# Patient Record
Sex: MALE | Race: WHITE | NOT HISPANIC OR LATINO | Employment: UNEMPLOYED | URBAN - METROPOLITAN AREA
[De-identification: names, ages, dates, MRNs, and addresses within clinical notes are randomized per-mention and may not be internally consistent; named-entity substitution may affect disease eponyms.]

---

## 2017-01-06 ENCOUNTER — HISTORICAL (OUTPATIENT)
Dept: LAB | Facility: HOSPITAL | Age: 62
End: 2017-01-06

## 2017-02-06 ENCOUNTER — HISTORICAL (OUTPATIENT)
Dept: LAB | Facility: HOSPITAL | Age: 62
End: 2017-02-06

## 2017-03-06 ENCOUNTER — HISTORICAL (OUTPATIENT)
Dept: LAB | Facility: HOSPITAL | Age: 62
End: 2017-03-06

## 2017-04-05 ENCOUNTER — HISTORICAL (OUTPATIENT)
Dept: LAB | Facility: HOSPITAL | Age: 62
End: 2017-04-05

## 2017-04-13 ENCOUNTER — HISTORICAL (OUTPATIENT)
Dept: LAB | Facility: HOSPITAL | Age: 62
End: 2017-04-13

## 2017-05-05 ENCOUNTER — HISTORICAL (OUTPATIENT)
Dept: LAB | Facility: HOSPITAL | Age: 62
End: 2017-05-05

## 2017-05-05 LAB
INR PPP: 3.17
PROTHROMBIN TIME: 32.4 SECOND(S) (ref 9–11.5)

## 2017-06-05 ENCOUNTER — HISTORICAL (OUTPATIENT)
Dept: LAB | Facility: HOSPITAL | Age: 62
End: 2017-06-05

## 2017-06-05 LAB
INR PPP: 3.89
PROTHROMBIN TIME: 39.8 SECOND(S) (ref 9–11.5)

## 2017-06-08 ENCOUNTER — HISTORICAL (OUTPATIENT)
Dept: LAB | Facility: HOSPITAL | Age: 62
End: 2017-06-08

## 2017-06-08 LAB
INR PPP: 3.86
PROTHROMBIN TIME: 39.5 SECOND(S) (ref 9–11.5)

## 2017-06-15 ENCOUNTER — HISTORICAL (OUTPATIENT)
Dept: LAB | Facility: HOSPITAL | Age: 62
End: 2017-06-15

## 2017-06-15 LAB
INR PPP: 2.85
PROTHROMBIN TIME: 29.1 SECOND(S) (ref 9–11.5)

## 2017-07-03 ENCOUNTER — HISTORICAL (OUTPATIENT)
Dept: LAB | Facility: HOSPITAL | Age: 62
End: 2017-07-03

## 2017-07-03 LAB
INR PPP: 1.75
PROTHROMBIN TIME: 17.8 SECOND(S) (ref 9–11.5)

## 2017-07-10 ENCOUNTER — HISTORICAL (OUTPATIENT)
Dept: LAB | Facility: HOSPITAL | Age: 62
End: 2017-07-10

## 2017-07-10 LAB
INR PPP: 2.81
PROTHROMBIN TIME: 28.7 SECOND(S) (ref 9–11.5)

## 2017-08-08 ENCOUNTER — HISTORICAL (OUTPATIENT)
Dept: LAB | Facility: HOSPITAL | Age: 62
End: 2017-08-08

## 2017-08-08 LAB
INR PPP: 2.89
PROTHROMBIN TIME: 29.5 SECOND(S) (ref 9–11.5)

## 2017-09-06 ENCOUNTER — HISTORICAL (OUTPATIENT)
Dept: LAB | Facility: HOSPITAL | Age: 62
End: 2017-09-06

## 2017-09-06 LAB
ABS NEUT (OLG): 7.71
ALBUMIN SERPL-MCNC: 3.5 GM/DL (ref 3.4–5)
ALBUMIN/GLOB SERPL: 0.8 RATIO (ref 1.1–2)
ALP SERPL-CCNC: 112 UNIT/L (ref 50–136)
ALT SERPL-CCNC: 15 UNIT/L (ref 12–78)
AST SERPL-CCNC: 14 UNIT/L (ref 10–37)
BASOPHILS # BLD AUTO: 0.06 X10(3)/MCL
BASOPHILS NFR BLD AUTO: 0.5 %
BILIRUB SERPL-MCNC: 0.3 MG/DL (ref 0.2–1)
BILIRUBIN DIRECT+TOT PNL SERPL-MCNC: 0.14 MG/DL (ref 0.05–0.2)
BILIRUBIN DIRECT+TOT PNL SERPL-MCNC: 0.16 MG/DL
BUN SERPL-MCNC: 15 MG/DL (ref 7–18)
CALCIUM SERPL-MCNC: 9.4 MG/DL (ref 8.5–10.1)
CHLORIDE SERPL-SCNC: 100 MMOL/L (ref 98–107)
CHOLEST SERPL-MCNC: 213 MG/DL (ref 50–200)
CHOLEST/HDLC SERPL: 5 {RATIO} (ref 0–5)
CO2 SERPL-SCNC: 28.6 MMOL/L (ref 21–32)
CREAT SERPL-MCNC: 1.12 MG/DL (ref 0.7–1.3)
EOSINOPHIL # BLD AUTO: 0.36 X10(3)/MCL
EOSINOPHIL NFR BLD AUTO: 2.8 %
ERYTHROCYTE [DISTWIDTH] IN BLOOD BY AUTOMATED COUNT: 15 %
GLOBULIN SER-MCNC: 4.4 GM/DL (ref 2.4–3.5)
GLUCOSE SERPL-MCNC: 82 MG/DL (ref 74–106)
HCT VFR BLD AUTO: 48.1 % (ref 39–49)
HDLC SERPL-MCNC: 41 MG/DL (ref 35–60)
HGB BLD-MCNC: 16.8 GM/DL (ref 12.6–16.6)
IMM GRANULOCYTES # BLD AUTO: 0.03 10*3/UL (ref 0–0.1)
IMM GRANULOCYTES NFR BLD AUTO: 0.2 % (ref 0–1)
INR PPP: 2.73
LDLC SERPL CALC-MCNC: 136 MG/DL (ref 50–140)
LYMPHOCYTES # BLD AUTO: 3.56 X10(3)/MCL
LYMPHOCYTES NFR BLD AUTO: 27.8 %
MCH RBC QN AUTO: 30.6 PG (ref 27–33)
MCHC RBC AUTO-ENTMCNC: 34.9 GM/DL (ref 32–35)
MCV RBC AUTO: 87.6 FL (ref 84–97)
MONOCYTES # BLD AUTO: 1.1 X10(3)/MCL
MONOCYTES NFR BLD AUTO: 8.6 %
NEUTROPHILS # BLD AUTO: 7.71 X10(3)/MCL
NEUTROPHILS NFR BLD AUTO: 60.1 %
PLATELET # BLD AUTO: 269 X10(3)/MCL (ref 151–368)
PMV BLD AUTO: 10 FL
POTASSIUM SERPL-SCNC: 4.2 MMOL/L (ref 3.5–5.1)
PROT SERPL-MCNC: 7.9 GM/DL (ref 6.4–8.2)
PROTHROMBIN TIME: 28.2 SECOND(S) (ref 9–11.5)
PSA SERPL-MCNC: 0.55 NG/ML (ref 0–4)
RBC # BLD AUTO: 5.49 X10(6)/MCL (ref 4.3–5.6)
SODIUM SERPL-SCNC: 136 MMOL/L (ref 136–145)
TRIGL SERPL-MCNC: 179 MG/DL (ref 30–150)
VLDLC SERPL CALC-MCNC: 36 MG/DL
WBC # SPEC AUTO: 12.82 X10(3)/MCL (ref 3.4–9.2)

## 2017-10-04 ENCOUNTER — HISTORICAL (OUTPATIENT)
Dept: LAB | Facility: HOSPITAL | Age: 62
End: 2017-10-04

## 2017-10-04 LAB
INR PPP: 3.04
PROTHROMBIN TIME: 31.3 SECOND(S) (ref 9–11.5)

## 2017-11-01 ENCOUNTER — HISTORICAL (OUTPATIENT)
Dept: LAB | Facility: HOSPITAL | Age: 62
End: 2017-11-01

## 2017-11-01 LAB
INR PPP: 2.87
PROTHROMBIN TIME: 29.6 SECOND(S) (ref 9–11.5)

## 2017-12-08 ENCOUNTER — HISTORICAL (OUTPATIENT)
Dept: LAB | Facility: HOSPITAL | Age: 62
End: 2017-12-08

## 2017-12-08 LAB
INR PPP: 1.76
PROTHROMBIN TIME: 17.8 SECOND(S) (ref 9–11.5)

## 2017-12-14 ENCOUNTER — HISTORICAL (OUTPATIENT)
Dept: LAB | Facility: HOSPITAL | Age: 62
End: 2017-12-14

## 2017-12-14 LAB
INR PPP: 3.05
PROTHROMBIN TIME: 30.8 SECOND(S) (ref 9–11.5)

## 2018-01-08 ENCOUNTER — HISTORICAL (OUTPATIENT)
Dept: LAB | Facility: HOSPITAL | Age: 63
End: 2018-01-08

## 2018-01-08 LAB
INR PPP: 5.32
PROTHROMBIN TIME: 53.7 SECOND(S) (ref 9–11.5)

## 2018-01-11 ENCOUNTER — HISTORICAL (OUTPATIENT)
Dept: LAB | Facility: HOSPITAL | Age: 63
End: 2018-01-11

## 2018-01-11 LAB
INR PPP: 1.32
PROTHROMBIN TIME: 13.3 SECOND(S) (ref 9–11.5)

## 2018-01-18 ENCOUNTER — HISTORICAL (OUTPATIENT)
Dept: LAB | Facility: HOSPITAL | Age: 63
End: 2018-01-18

## 2018-01-18 LAB
INR PPP: 1.24
PROTHROMBIN TIME: 12.5 SECOND(S) (ref 9–11.5)

## 2018-01-26 ENCOUNTER — HISTORICAL (OUTPATIENT)
Dept: LAB | Facility: HOSPITAL | Age: 63
End: 2018-01-26

## 2018-01-26 LAB
INR PPP: 2.28
PROTHROMBIN TIME: 23 SECOND(S) (ref 9–11.5)

## 2018-02-08 ENCOUNTER — HISTORICAL (OUTPATIENT)
Dept: LAB | Facility: HOSPITAL | Age: 63
End: 2018-02-08

## 2018-02-08 LAB
INR PPP: 3.71
PROTHROMBIN TIME: 40.6 SECOND(S) (ref 9–11.5)

## 2018-03-07 ENCOUNTER — HISTORICAL (OUTPATIENT)
Dept: LAB | Facility: HOSPITAL | Age: 63
End: 2018-03-07

## 2018-03-07 LAB
INR PPP: 4.28
PROTHROMBIN TIME: 43.2 SECOND(S) (ref 9–11.5)

## 2018-03-09 ENCOUNTER — HISTORICAL (OUTPATIENT)
Dept: LAB | Facility: HOSPITAL | Age: 63
End: 2018-03-09

## 2018-03-09 LAB
INR PPP: 1.83
PROTHROMBIN TIME: 18.5 SECOND(S) (ref 9–11.5)

## 2018-03-19 ENCOUNTER — HISTORICAL (OUTPATIENT)
Dept: LAB | Facility: HOSPITAL | Age: 63
End: 2018-03-19

## 2018-03-19 LAB
INR PPP: 2.71
PROTHROMBIN TIME: 27.4 SECOND(S) (ref 9–11.5)

## 2018-04-09 ENCOUNTER — HISTORICAL (OUTPATIENT)
Dept: LAB | Facility: HOSPITAL | Age: 63
End: 2018-04-09

## 2018-04-09 LAB
INR PPP: 4.36
PROTHROMBIN TIME: 44 SECOND(S) (ref 9–11.5)

## 2018-04-13 ENCOUNTER — HISTORICAL (OUTPATIENT)
Dept: LAB | Facility: HOSPITAL | Age: 63
End: 2018-04-13

## 2018-04-13 LAB
INR PPP: 1.57
PROTHROMBIN TIME: 15.9 SECOND(S) (ref 9.3–11.4)

## 2018-04-27 ENCOUNTER — HISTORICAL (OUTPATIENT)
Dept: LAB | Facility: HOSPITAL | Age: 63
End: 2018-04-27

## 2018-04-27 LAB
INR PPP: 2.4
PROTHROMBIN TIME: 24 SECOND(S) (ref 9.3–11.4)

## 2018-05-08 ENCOUNTER — HISTORICAL (OUTPATIENT)
Dept: LAB | Facility: HOSPITAL | Age: 63
End: 2018-05-08

## 2018-05-08 LAB
INR PPP: 2.2
PROTHROMBIN TIME: 22.7 SECOND(S) (ref 9.3–11.4)

## 2018-06-08 ENCOUNTER — HISTORICAL (OUTPATIENT)
Dept: LAB | Facility: HOSPITAL | Age: 63
End: 2018-06-08

## 2018-06-08 LAB
INR PPP: 4
PROTHROMBIN TIME: 40.5 SECOND(S) (ref 9.3–11.4)

## 2018-06-15 ENCOUNTER — HISTORICAL (OUTPATIENT)
Dept: LAB | Facility: HOSPITAL | Age: 63
End: 2018-06-15

## 2018-06-15 LAB
INR PPP: 2.6
PROTHROMBIN TIME: 26 SECOND(S) (ref 9.3–11.4)

## 2018-07-10 ENCOUNTER — HISTORICAL (OUTPATIENT)
Dept: LAB | Facility: HOSPITAL | Age: 63
End: 2018-07-10

## 2018-07-10 LAB
INR PPP: 2.3
PROTHROMBIN TIME: 23.5 SECOND(S) (ref 9.3–11.4)

## 2018-08-09 ENCOUNTER — HISTORICAL (OUTPATIENT)
Dept: LAB | Facility: HOSPITAL | Age: 63
End: 2018-08-09

## 2018-08-09 LAB
INR PPP: 3.3
PROTHROMBIN TIME: 33.1 SECOND(S) (ref 9.3–11.4)

## 2018-09-10 ENCOUNTER — HISTORICAL (OUTPATIENT)
Dept: LAB | Facility: HOSPITAL | Age: 63
End: 2018-09-10

## 2018-09-10 LAB
BUN SERPL-MCNC: 11 MG/DL (ref 7–18)
CALCIUM SERPL-MCNC: 9 MG/DL (ref 8.5–10.1)
CHLORIDE SERPL-SCNC: 101 MMOL/L (ref 98–107)
CO2 SERPL-SCNC: 31.5 MMOL/L (ref 21–32)
CREAT SERPL-MCNC: 0.84 MG/DL (ref 0.7–1.3)
CREAT/UREA NIT SERPL: 13
GLUCOSE SERPL-MCNC: 79 MG/DL (ref 74–106)
INR PPP: 2.4
POTASSIUM SERPL-SCNC: 3.6 MMOL/L (ref 3.5–5.1)
PROTHROMBIN TIME: 23.8 SECOND(S) (ref 9.3–11.4)
SODIUM SERPL-SCNC: 137 MMOL/L (ref 136–145)

## 2018-10-08 ENCOUNTER — HISTORICAL (OUTPATIENT)
Dept: LAB | Facility: HOSPITAL | Age: 63
End: 2018-10-08

## 2018-10-08 LAB
INR PPP: 2.3
PROTHROMBIN TIME: 23.6 SECOND(S) (ref 9.3–11.4)

## 2018-11-08 ENCOUNTER — HISTORICAL (OUTPATIENT)
Dept: LAB | Facility: HOSPITAL | Age: 63
End: 2018-11-08

## 2018-11-08 LAB
INR PPP: 1.9
PROTHROMBIN TIME: 17.8 SECOND(S) (ref 8.6–10.1)

## 2018-11-15 ENCOUNTER — HISTORICAL (OUTPATIENT)
Dept: LAB | Facility: HOSPITAL | Age: 63
End: 2018-11-15

## 2018-11-15 LAB
INR PPP: 4.1
PROTHROMBIN TIME: 36.4 SECOND(S) (ref 8.6–10.1)

## 2018-11-20 ENCOUNTER — HISTORICAL (OUTPATIENT)
Dept: LAB | Facility: HOSPITAL | Age: 63
End: 2018-11-20

## 2018-11-20 LAB
INR PPP: 1.3
PROTHROMBIN TIME: 12.5 SECOND(S) (ref 8.6–10.1)

## 2018-12-06 ENCOUNTER — HISTORICAL (OUTPATIENT)
Dept: LAB | Facility: HOSPITAL | Age: 63
End: 2018-12-06

## 2018-12-06 LAB
INR PPP: 2.3
PROTHROMBIN TIME: 21 SECOND(S) (ref 8.6–10.1)

## 2018-12-20 ENCOUNTER — HISTORICAL (OUTPATIENT)
Dept: LAB | Facility: HOSPITAL | Age: 63
End: 2018-12-20

## 2018-12-20 LAB
INR PPP: 5
PROTHROMBIN TIME: 44 SECOND(S) (ref 8.6–10.1)
TSH SERPL-ACNC: 2.49 MIU/ML (ref 0.35–3.75)

## 2018-12-26 ENCOUNTER — HISTORICAL (OUTPATIENT)
Dept: LAB | Facility: HOSPITAL | Age: 63
End: 2018-12-26

## 2018-12-26 LAB
INR PPP: 1.4
PROTHROMBIN TIME: 12.8 SECOND(S) (ref 8.6–10.1)

## 2019-01-07 ENCOUNTER — HISTORICAL (OUTPATIENT)
Dept: LAB | Facility: HOSPITAL | Age: 64
End: 2019-01-07

## 2019-01-07 LAB
ABS NEUT (OLG): 5.14
ALBUMIN SERPL-MCNC: 3.5 GM/DL (ref 3.4–5)
ALBUMIN/GLOB SERPL: 0.8 RATIO (ref 1.1–2)
ALP SERPL-CCNC: 121 UNIT/L (ref 46–116)
ALT SERPL-CCNC: 18 UNIT/L (ref 12–78)
AST SERPL-CCNC: 14 UNIT/L (ref 10–37)
BASOPHILS # BLD AUTO: 0.08 X10(3)/MCL
BASOPHILS NFR BLD AUTO: 0.8 %
BILIRUB SERPL-MCNC: 0.4 MG/DL (ref 0.2–1)
BILIRUBIN DIRECT+TOT PNL SERPL-MCNC: 0.12 MG/DL (ref 0–0.2)
BILIRUBIN DIRECT+TOT PNL SERPL-MCNC: 0.28 MG/DL
BUN SERPL-MCNC: 15 MG/DL (ref 7–18)
CALCIUM SERPL-MCNC: 8.9 MG/DL (ref 8.5–10.1)
CHLORIDE SERPL-SCNC: 99 MMOL/L (ref 98–107)
CHOLEST SERPL-MCNC: 223 MG/DL (ref 50–200)
CHOLEST/HDLC SERPL: 6 {RATIO} (ref 0–5)
CO2 SERPL-SCNC: 30.5 MMOL/L (ref 21–32)
CREAT SERPL-MCNC: 1.02 MG/DL (ref 0.7–1.3)
EOSINOPHIL # BLD AUTO: 0.26 X10(3)/MCL
EOSINOPHIL NFR BLD AUTO: 2.6 %
ERYTHROCYTE [DISTWIDTH] IN BLOOD BY AUTOMATED COUNT: 15 %
GLOBULIN SER-MCNC: 4.4 GM/DL (ref 2.4–3.5)
GLUCOSE SERPL-MCNC: 80 MG/DL (ref 74–106)
HCT VFR BLD AUTO: 49.8 % (ref 39–49)
HDLC SERPL-MCNC: 38 MG/DL (ref 35–60)
HGB BLD-MCNC: 17.2 GM/DL (ref 12.6–16.6)
IMM GRANULOCYTES # BLD AUTO: 0.02 10*3/UL (ref 0–0.1)
IMM GRANULOCYTES NFR BLD AUTO: 0.2 % (ref 0–1)
INR PPP: 3.1
LDLC SERPL CALC-MCNC: 159 MG/DL (ref 50–140)
LYMPHOCYTES # BLD AUTO: 3.59 X10(3)/MCL
LYMPHOCYTES NFR BLD AUTO: 36.5 %
MCH RBC QN AUTO: 30.7 PG (ref 27–33)
MCHC RBC AUTO-ENTMCNC: 34.5 GM/DL (ref 32–35)
MCV RBC AUTO: 88.8 FL (ref 84–97)
MONOCYTES # BLD AUTO: 0.75 X10(3)/MCL
MONOCYTES NFR BLD AUTO: 7.6 %
NEUTROPHILS # BLD AUTO: 5.14 X10(3)/MCL
NEUTROPHILS NFR BLD AUTO: 52.3 %
PLATELET # BLD AUTO: 261 X10(3)/MCL (ref 151–368)
PMV BLD AUTO: 10 FL
POTASSIUM SERPL-SCNC: 4 MMOL/L (ref 3.5–5.1)
PROT SERPL-MCNC: 7.9 GM/DL (ref 6.4–8.2)
PROTHROMBIN TIME: 27.9 SECOND(S) (ref 8.6–10.1)
PSA SERPL-MCNC: 0.6 NG/ML (ref 0–4)
RBC # BLD AUTO: 5.61 X10(6)/MCL (ref 4.3–5.6)
SODIUM SERPL-SCNC: 137 MMOL/L (ref 136–145)
TRIGL SERPL-MCNC: 128 MG/DL (ref 30–150)
VLDLC SERPL CALC-MCNC: 26 MG/DL
WBC # SPEC AUTO: 9.84 X10(3)/MCL (ref 3.4–9.2)

## 2019-02-14 ENCOUNTER — HISTORICAL (OUTPATIENT)
Dept: LAB | Facility: HOSPITAL | Age: 64
End: 2019-02-14

## 2019-02-14 LAB
INR PPP: 1.8
PROTHROMBIN TIME: 16.6 SECOND(S) (ref 8.6–10.1)

## 2019-02-21 ENCOUNTER — HISTORICAL (OUTPATIENT)
Dept: LAB | Facility: HOSPITAL | Age: 64
End: 2019-02-21

## 2019-02-21 LAB
INR PPP: 1.4
PROTHROMBIN TIME: 12.9 SECOND(S) (ref 8.6–10.1)

## 2019-02-28 ENCOUNTER — HISTORICAL (OUTPATIENT)
Dept: LAB | Facility: HOSPITAL | Age: 64
End: 2019-02-28

## 2019-02-28 LAB
INR PPP: 3.5
PROTHROMBIN TIME: 31.3 SECOND(S) (ref 8.6–10.1)

## 2019-03-12 ENCOUNTER — HISTORICAL (OUTPATIENT)
Dept: LAB | Facility: HOSPITAL | Age: 64
End: 2019-03-12

## 2019-03-12 LAB
INR PPP: 3.4
PROTHROMBIN TIME: 30 SECOND(S) (ref 8.6–10.1)

## 2019-04-10 ENCOUNTER — HISTORICAL (OUTPATIENT)
Dept: LAB | Facility: HOSPITAL | Age: 64
End: 2019-04-10

## 2019-04-10 LAB
INR PPP: 2.4
PROTHROMBIN TIME: 22.2 SECOND(S) (ref 8.6–10.1)

## 2019-05-09 ENCOUNTER — HISTORICAL (OUTPATIENT)
Dept: LAB | Facility: HOSPITAL | Age: 64
End: 2019-05-09

## 2019-05-09 LAB
INR PPP: 4.9
PROTHROMBIN TIME: 42.6 SECOND(S) (ref 8.6–10.1)

## 2019-05-13 ENCOUNTER — HISTORICAL (OUTPATIENT)
Dept: LAB | Facility: HOSPITAL | Age: 64
End: 2019-05-13

## 2019-05-13 LAB
INR PPP: 1
PROTHROMBIN TIME: 10 SECOND(S) (ref 8.6–10.1)

## 2019-05-16 ENCOUNTER — HISTORICAL (OUTPATIENT)
Dept: LAB | Facility: HOSPITAL | Age: 64
End: 2019-05-16

## 2019-05-16 LAB
INR PPP: 1.4
PROTHROMBIN TIME: 13.3 SECOND(S) (ref 8.6–10.1)

## 2019-05-22 ENCOUNTER — HISTORICAL (OUTPATIENT)
Dept: LAB | Facility: HOSPITAL | Age: 64
End: 2019-05-22

## 2019-05-22 LAB
INR PPP: 2.8
PROTHROMBIN TIME: 25 SECOND(S) (ref 8.6–10.1)

## 2019-06-10 ENCOUNTER — HISTORICAL (OUTPATIENT)
Dept: LAB | Facility: HOSPITAL | Age: 64
End: 2019-06-10

## 2019-06-10 LAB
INR PPP: 4.7
PROTHROMBIN TIME: 41.5 SECOND(S) (ref 8.6–10.1)

## 2019-06-13 ENCOUNTER — HISTORICAL (OUTPATIENT)
Dept: LAB | Facility: HOSPITAL | Age: 64
End: 2019-06-13

## 2019-06-13 LAB
INR PPP: 1.5
PROTHROMBIN TIME: 14.2 SECOND(S) (ref 8.6–10.1)

## 2019-06-27 ENCOUNTER — HISTORICAL (OUTPATIENT)
Dept: LAB | Facility: HOSPITAL | Age: 64
End: 2019-06-27

## 2019-06-27 LAB
INR PPP: 2.8
PROTHROMBIN TIME: 25.2 SECOND(S) (ref 8.6–10.1)

## 2019-07-11 ENCOUNTER — HISTORICAL (OUTPATIENT)
Dept: LAB | Facility: HOSPITAL | Age: 64
End: 2019-07-11

## 2019-07-11 LAB
INR PPP: 3.2
PROTHROMBIN TIME: 28.4 SECOND(S) (ref 8.6–10.1)

## 2019-08-08 ENCOUNTER — HISTORICAL (OUTPATIENT)
Dept: LAB | Facility: HOSPITAL | Age: 64
End: 2019-08-08

## 2019-08-08 LAB
INR PPP: 4.1
PROTHROMBIN TIME: 36.2 SECOND(S) (ref 8.6–10.1)

## 2019-08-12 ENCOUNTER — HISTORICAL (OUTPATIENT)
Dept: LAB | Facility: HOSPITAL | Age: 64
End: 2019-08-12

## 2019-08-12 LAB
INR PPP: 1.1
PROTHROMBIN TIME: 10.4 SECOND(S) (ref 8.6–10.1)

## 2019-08-19 ENCOUNTER — HISTORICAL (OUTPATIENT)
Dept: LAB | Facility: HOSPITAL | Age: 64
End: 2019-08-19

## 2019-08-19 LAB
INR PPP: 2.4
PROTHROMBIN TIME: 21.9 SECOND(S) (ref 8.6–10.1)

## 2019-09-09 ENCOUNTER — HISTORICAL (OUTPATIENT)
Dept: LAB | Facility: HOSPITAL | Age: 64
End: 2019-09-09

## 2019-09-09 LAB
INR PPP: 3.8
PROTHROMBIN TIME: 33.8 SECOND(S) (ref 8.6–10.1)

## 2019-09-11 ENCOUNTER — HISTORICAL (OUTPATIENT)
Dept: LAB | Facility: HOSPITAL | Age: 64
End: 2019-09-11

## 2019-09-11 LAB
INR PPP: 2.6
PROTHROMBIN TIME: 23.7 SECOND(S) (ref 8.6–10.1)

## 2019-09-24 ENCOUNTER — HISTORICAL (OUTPATIENT)
Dept: LAB | Facility: HOSPITAL | Age: 64
End: 2019-09-24

## 2019-09-24 LAB
INR PPP: 1.3
PROTHROMBIN TIME: 12.5 SECOND(S) (ref 8.6–10.1)

## 2019-09-30 ENCOUNTER — HISTORICAL (OUTPATIENT)
Dept: LAB | Facility: HOSPITAL | Age: 64
End: 2019-09-30

## 2019-09-30 LAB
INR PPP: 1.5
PROTHROMBIN TIME: 14.3 SECOND(S) (ref 8.6–10.1)

## 2019-10-07 ENCOUNTER — HISTORICAL (OUTPATIENT)
Dept: LAB | Facility: HOSPITAL | Age: 64
End: 2019-10-07

## 2019-10-07 LAB
INR PPP: 2.3
PROTHROMBIN TIME: 20.9 SECOND(S) (ref 8.6–10.1)

## 2019-10-21 ENCOUNTER — HISTORICAL (OUTPATIENT)
Dept: LAB | Facility: HOSPITAL | Age: 64
End: 2019-10-21

## 2019-10-21 LAB
INR PPP: 3.4
PROTHROMBIN TIME: 30.6 SECOND(S) (ref 8.6–10.1)

## 2019-11-11 ENCOUNTER — HISTORICAL (OUTPATIENT)
Dept: LAB | Facility: HOSPITAL | Age: 64
End: 2019-11-11

## 2019-11-11 LAB
INR PPP: 3.7
PROTHROMBIN TIME: 32.7 SECOND(S) (ref 8.6–10.1)

## 2019-11-18 ENCOUNTER — HISTORICAL (OUTPATIENT)
Dept: LAB | Facility: HOSPITAL | Age: 64
End: 2019-11-18

## 2019-11-18 LAB
INR PPP: 1.1
PROTHROMBIN TIME: 10.9 SECOND(S) (ref 8.6–10.1)

## 2019-11-25 ENCOUNTER — HISTORICAL (OUTPATIENT)
Dept: LAB | Facility: HOSPITAL | Age: 64
End: 2019-11-25

## 2019-11-25 LAB
INR PPP: 2.6
PROTHROMBIN TIME: 26 SECOND(S) (ref 8.6–10.1)

## 2019-12-09 ENCOUNTER — HISTORICAL (OUTPATIENT)
Dept: LAB | Facility: HOSPITAL | Age: 64
End: 2019-12-09

## 2019-12-09 LAB
INR PPP: 4.7
PROTHROMBIN TIME: 47.4 SECOND(S) (ref 8.6–10.1)

## 2019-12-11 ENCOUNTER — HISTORICAL (OUTPATIENT)
Dept: LAB | Facility: HOSPITAL | Age: 64
End: 2019-12-11

## 2019-12-11 LAB
INR PPP: 1.8
PROTHROMBIN TIME: 18.3 SECOND(S) (ref 8.6–10.1)

## 2019-12-18 ENCOUNTER — HISTORICAL (OUTPATIENT)
Dept: LAB | Facility: HOSPITAL | Age: 64
End: 2019-12-18

## 2019-12-18 LAB
INR PPP: 2.3
PROTHROMBIN TIME: 23.7 SECOND(S) (ref 8.6–10.1)

## 2019-12-26 ENCOUNTER — HISTORICAL (OUTPATIENT)
Dept: LAB | Facility: HOSPITAL | Age: 64
End: 2019-12-26

## 2019-12-26 LAB
INR PPP: 1.7
PROTHROMBIN TIME: 17.4 SECOND(S) (ref 8.6–10.1)

## 2020-01-02 ENCOUNTER — HISTORICAL (OUTPATIENT)
Dept: LAB | Facility: HOSPITAL | Age: 65
End: 2020-01-02

## 2020-01-02 LAB
INR PPP: 1.6
PROTHROMBIN TIME: 16.4 SECOND(S) (ref 8.6–10.1)

## 2020-01-27 ENCOUNTER — HISTORICAL (OUTPATIENT)
Dept: LAB | Facility: HOSPITAL | Age: 65
End: 2020-01-27

## 2020-01-27 LAB
BUN SERPL-MCNC: 19 MG/DL (ref 8.4–25.7)
CALCIUM SERPL-MCNC: 9.8 MG/DL (ref 8.8–10)
CHLORIDE SERPL-SCNC: 98 MMOL/L (ref 98–107)
CO2 SERPL-SCNC: 32 MEQ/L (ref 23–31)
CREAT SERPL-MCNC: 1 MG/DL (ref 0.73–1.18)
CREAT/UREA NIT SERPL: 19
GLUCOSE SERPL-MCNC: 74 MG/DL (ref 82–115)
INR PPP: 1.6
POTASSIUM SERPL-SCNC: 4 MMOL/L (ref 3.5–5.1)
PROTHROMBIN TIME: 15.9 SECOND(S) (ref 8.6–10.1)
SODIUM SERPL-SCNC: 138 MMOL/L (ref 136–145)

## 2020-02-03 ENCOUNTER — HISTORICAL (OUTPATIENT)
Dept: LAB | Facility: HOSPITAL | Age: 65
End: 2020-02-03

## 2020-02-03 LAB
INR PPP: 3
PROTHROMBIN TIME: 30.6 SECOND(S) (ref 8.6–10.1)

## 2020-03-09 ENCOUNTER — HISTORICAL (OUTPATIENT)
Dept: LAB | Facility: HOSPITAL | Age: 65
End: 2020-03-09

## 2020-03-09 LAB
INR PPP: 3.4
PROTHROMBIN TIME: 34 SECOND(S) (ref 8.6–10.1)

## 2020-04-17 ENCOUNTER — HISTORICAL (OUTPATIENT)
Dept: LAB | Facility: HOSPITAL | Age: 65
End: 2020-04-17

## 2020-04-17 LAB
INR PPP: 3
PROTHROMBIN TIME: 29.9 SECOND(S) (ref 8.6–10.1)

## 2020-05-08 ENCOUNTER — HISTORICAL (OUTPATIENT)
Dept: LAB | Facility: HOSPITAL | Age: 65
End: 2020-05-08

## 2020-05-08 LAB
INR PPP: 3.6
PROTHROMBIN TIME: 36 SECOND(S) (ref 8.6–10.1)

## 2020-05-15 ENCOUNTER — HISTORICAL (OUTPATIENT)
Dept: LAB | Facility: HOSPITAL | Age: 65
End: 2020-05-15

## 2020-05-15 LAB
INR PPP: 2.9
PROTHROMBIN TIME: 29.2 SECOND(S) (ref 8.6–10.1)

## 2020-06-08 ENCOUNTER — HISTORICAL (OUTPATIENT)
Dept: LAB | Facility: HOSPITAL | Age: 65
End: 2020-06-08

## 2020-06-08 LAB
INR PPP: 6.2
PROTHROMBIN TIME: 63.1 SECOND(S) (ref 8.6–10.1)

## 2020-06-10 ENCOUNTER — HISTORICAL (OUTPATIENT)
Dept: LAB | Facility: HOSPITAL | Age: 65
End: 2020-06-10

## 2020-06-10 LAB
ALBUMIN SERPL-MCNC: 3.1 GM/DL (ref 3.4–4.8)
ALBUMIN/GLOB SERPL: 0.8 RATIO (ref 1.1–2)
ALP SERPL-CCNC: 138 UNIT/L (ref 40–150)
ALT SERPL-CCNC: 10 UNIT/L (ref 0–55)
AST SERPL-CCNC: 18 UNIT/L (ref 5–34)
BILIRUB SERPL-MCNC: 0.8 MG/DL
BILIRUBIN DIRECT+TOT PNL SERPL-MCNC: 0.4 MG/DL
BILIRUBIN DIRECT+TOT PNL SERPL-MCNC: 0.4 MG/DL (ref 0–0.5)
BUN SERPL-MCNC: 26 MG/DL (ref 8.4–25.7)
CALCIUM SERPL-MCNC: 8.7 MG/DL (ref 8.8–10)
CHLORIDE SERPL-SCNC: 104 MMOL/L (ref 98–107)
CHOLEST SERPL-MCNC: 90 MG/DL
CHOLEST/HDLC SERPL: 3 {RATIO} (ref 0–5)
CO2 SERPL-SCNC: 26 MEQ/L (ref 23–31)
CREAT SERPL-MCNC: 1.13 MG/DL (ref 0.73–1.18)
GLOBULIN SER-MCNC: 3.8 GM/DL (ref 2.4–3.5)
GLUCOSE SERPL-MCNC: 88 MG/DL (ref 82–115)
HDLC SERPL-MCNC: 30 MG/DL (ref 35–60)
INR PPP: 2.4
LDLC SERPL CALC-MCNC: 45 MG/DL (ref 50–140)
POTASSIUM SERPL-SCNC: 4.3 MMOL/L (ref 3.5–5.1)
PROT SERPL-MCNC: 6.9 GM/DL (ref 5.8–7.6)
PROTHROMBIN TIME: 24.2 SECOND(S) (ref 8.6–10.1)
PSA SERPL-MCNC: 0.41 NG/ML
SODIUM SERPL-SCNC: 138 MMOL/L (ref 136–145)
TRIGL SERPL-MCNC: 77 MG/DL (ref 34–140)
VLDLC SERPL CALC-MCNC: 15 MG/DL

## 2020-06-23 ENCOUNTER — HISTORICAL (OUTPATIENT)
Dept: LAB | Facility: HOSPITAL | Age: 65
End: 2020-06-23

## 2020-06-23 LAB
INR PPP: 3
PROTHROMBIN TIME: 30.2 SECOND(S) (ref 8.6–10.1)

## 2020-07-09 ENCOUNTER — HISTORICAL (OUTPATIENT)
Dept: LAB | Facility: HOSPITAL | Age: 65
End: 2020-07-09

## 2020-07-09 LAB
INR PPP: 4.3
PROTHROMBIN TIME: 43.2 SECOND(S) (ref 8.6–10.1)

## 2020-07-13 ENCOUNTER — HISTORICAL (OUTPATIENT)
Dept: LAB | Facility: HOSPITAL | Age: 65
End: 2020-07-13

## 2020-07-13 LAB
INR PPP: 1.8
PROTHROMBIN TIME: 18 SECOND(S) (ref 8.6–10.1)

## 2020-07-20 ENCOUNTER — HISTORICAL (OUTPATIENT)
Dept: LAB | Facility: HOSPITAL | Age: 65
End: 2020-07-20

## 2020-07-20 LAB
INR PPP: 2.2
PROTHROMBIN TIME: 22.7 SECOND(S) (ref 8.6–10.1)

## 2020-07-27 ENCOUNTER — HISTORICAL (OUTPATIENT)
Dept: LAB | Facility: HOSPITAL | Age: 65
End: 2020-07-27

## 2020-07-27 LAB
INR PPP: 3.5
PROTHROMBIN TIME: 35.9 SECOND(S) (ref 8.6–10.1)

## 2020-08-03 ENCOUNTER — HISTORICAL (OUTPATIENT)
Dept: LAB | Facility: HOSPITAL | Age: 65
End: 2020-08-03

## 2020-08-03 LAB
INR PPP: 5.9
PROTHROMBIN TIME: 59.2 SECOND(S) (ref 8.6–10.1)

## 2020-08-05 ENCOUNTER — HISTORICAL (OUTPATIENT)
Dept: LAB | Facility: HOSPITAL | Age: 65
End: 2020-08-05

## 2020-08-05 LAB
INR PPP: 2.8
PROTHROMBIN TIME: 28.7 SECOND(S) (ref 8.6–10.1)

## 2020-09-08 ENCOUNTER — HISTORICAL (OUTPATIENT)
Dept: LAB | Facility: HOSPITAL | Age: 65
End: 2020-09-08

## 2020-09-08 LAB
INR PPP: 5.1
PROTHROMBIN TIME: 51.3 SECOND(S) (ref 8.6–10.1)

## 2020-09-10 ENCOUNTER — HISTORICAL (OUTPATIENT)
Dept: LAB | Facility: HOSPITAL | Age: 65
End: 2020-09-10

## 2020-09-10 LAB
INR PPP: 2.8
PROTHROMBIN TIME: 28.8 SECOND(S) (ref 8.6–10.1)

## 2020-09-17 ENCOUNTER — HISTORICAL (OUTPATIENT)
Dept: LAB | Facility: HOSPITAL | Age: 65
End: 2020-09-17

## 2020-09-17 LAB
INR PPP: 1.6
PROTHROMBIN TIME: 16.3 SECOND(S) (ref 8.6–10.1)

## 2020-09-24 ENCOUNTER — HISTORICAL (OUTPATIENT)
Dept: LAB | Facility: HOSPITAL | Age: 65
End: 2020-09-24

## 2020-09-24 LAB
INR PPP: 3.9
PROTHROMBIN TIME: 39.9 SECOND(S) (ref 8.6–10.1)

## 2020-10-01 ENCOUNTER — HISTORICAL (OUTPATIENT)
Dept: LAB | Facility: HOSPITAL | Age: 65
End: 2020-10-01

## 2020-10-01 LAB
ABS NEUT (OLG): 7.95
BASOPHILS # BLD AUTO: 0.07 X10(3)/MCL
BASOPHILS NFR BLD AUTO: 0.6 %
BUN SERPL-MCNC: 19 MG/DL (ref 8.4–25.7)
CALCIUM SERPL-MCNC: 9 MG/DL (ref 8.8–10)
CHLORIDE SERPL-SCNC: 101 MMOL/L (ref 98–107)
CO2 SERPL-SCNC: 29 MEQ/L (ref 23–31)
CREAT SERPL-MCNC: 0.96 MG/DL (ref 0.73–1.18)
CREAT/UREA NIT SERPL: 20
EOSINOPHIL # BLD AUTO: 0.22 X10(3)/MCL
EOSINOPHIL NFR BLD AUTO: 1.8 %
ERYTHROCYTE [DISTWIDTH] IN BLOOD BY AUTOMATED COUNT: 16 %
GLUCOSE SERPL-MCNC: 88 MG/DL (ref 82–115)
HCT VFR BLD AUTO: 48.5 % (ref 39–49)
HGB BLD-MCNC: 16.6 GM/DL (ref 12.6–16.6)
IMM GRANULOCYTES # BLD AUTO: 0.03 10*3/UL (ref 0–0.1)
IMM GRANULOCYTES NFR BLD AUTO: 0.3 % (ref 0–1)
INR PPP: 3.5
LYMPHOCYTES # BLD AUTO: 2.76 X10(3)/MCL
LYMPHOCYTES NFR BLD AUTO: 23.1 %
MCH RBC QN AUTO: 30.3 PG (ref 27–33)
MCHC RBC AUTO-ENTMCNC: 34.2 GM/DL (ref 32–35)
MCV RBC AUTO: 88.7 FL (ref 84–97)
MONOCYTES # BLD AUTO: 0.9 X10(3)/MCL
MONOCYTES NFR BLD AUTO: 7.5 %
NEUTROPHILS # BLD AUTO: 7.95 X10(3)/MCL
NEUTROPHILS NFR BLD AUTO: 66.7 %
PLATELET # BLD AUTO: 280 X10(3)/MCL (ref 140–450)
PMV BLD AUTO: 9 FL
POTASSIUM SERPL-SCNC: 4.5 MMOL/L (ref 3.5–5.1)
PROTHROMBIN TIME: 35.8 SECOND(S) (ref 8.6–10.1)
RBC # BLD AUTO: 5.47 X10(6)/MCL (ref 4.3–5.6)
SODIUM SERPL-SCNC: 140 MMOL/L (ref 136–145)
WBC # SPEC AUTO: 11.93 X10(3)/MCL (ref 3.4–9.2)

## 2020-10-08 ENCOUNTER — HISTORICAL (OUTPATIENT)
Dept: LAB | Facility: HOSPITAL | Age: 65
End: 2020-10-08

## 2020-10-08 LAB
INR PPP: 4.1
PROTHROMBIN TIME: 41.8 SECOND(S) (ref 8.6–10.1)

## 2020-10-15 ENCOUNTER — HISTORICAL (OUTPATIENT)
Dept: LAB | Facility: HOSPITAL | Age: 65
End: 2020-10-15

## 2020-10-15 LAB
INR PPP: 3.1
PROTHROMBIN TIME: 31 SECOND(S) (ref 8.6–10.1)

## 2020-10-21 ENCOUNTER — HISTORICAL (OUTPATIENT)
Dept: RADIOLOGY | Facility: HOSPITAL | Age: 65
End: 2020-10-21

## 2020-11-02 ENCOUNTER — HISTORICAL (OUTPATIENT)
Dept: RADIOLOGY | Facility: HOSPITAL | Age: 65
End: 2020-11-02

## 2020-11-09 ENCOUNTER — HISTORICAL (OUTPATIENT)
Dept: LAB | Facility: HOSPITAL | Age: 65
End: 2020-11-09

## 2020-11-09 LAB
INR PPP: 3.4
PROTHROMBIN TIME: 34.2 SECOND(S) (ref 8.6–10.1)

## 2020-11-23 ENCOUNTER — HISTORICAL (OUTPATIENT)
Dept: LAB | Facility: HOSPITAL | Age: 65
End: 2020-11-23

## 2020-11-23 LAB
INR PPP: 3.8
PROTHROMBIN TIME: 35.3 SECOND(S) (ref 9.1–10.9)

## 2020-11-30 ENCOUNTER — HISTORICAL (OUTPATIENT)
Dept: LAB | Facility: HOSPITAL | Age: 65
End: 2020-11-30

## 2020-11-30 LAB
INR PPP: 3.3
PROTHROMBIN TIME: 31 SECOND(S) (ref 9.1–10.9)

## 2020-12-14 ENCOUNTER — HISTORICAL (OUTPATIENT)
Dept: LAB | Facility: HOSPITAL | Age: 65
End: 2020-12-14

## 2020-12-14 LAB
INR PPP: 2.6
PROTHROMBIN TIME: 24.6 SECOND(S) (ref 9.1–10.9)

## 2021-01-08 ENCOUNTER — HISTORICAL (OUTPATIENT)
Dept: LAB | Facility: HOSPITAL | Age: 66
End: 2021-01-08

## 2021-01-08 LAB
INR PPP: 2.8
PROTHROMBIN TIME: 26.4 SECOND(S) (ref 9.1–10.9)

## 2021-02-08 ENCOUNTER — HISTORICAL (OUTPATIENT)
Dept: LAB | Facility: HOSPITAL | Age: 66
End: 2021-02-08

## 2021-02-08 LAB
INR PPP: 2.9
PROTHROMBIN TIME: 28 SECOND(S) (ref 9.1–10.9)

## 2021-03-09 ENCOUNTER — HISTORICAL (OUTPATIENT)
Dept: LAB | Facility: HOSPITAL | Age: 66
End: 2021-03-09

## 2021-03-09 LAB
INR PPP: 3.5
PROTHROMBIN TIME: 33.1 SECOND(S) (ref 9.1–10.9)

## 2021-04-08 ENCOUNTER — HISTORICAL (OUTPATIENT)
Dept: LAB | Facility: HOSPITAL | Age: 66
End: 2021-04-08

## 2021-04-08 LAB
INR PPP: 3.3
PROTHROMBIN TIME: 31.6 SECOND(S) (ref 9.1–10.9)

## 2021-05-10 ENCOUNTER — HISTORICAL (OUTPATIENT)
Dept: LAB | Facility: HOSPITAL | Age: 66
End: 2021-05-10

## 2021-05-10 LAB
INR PPP: 3.7
PROTHROMBIN TIME: 35.1 SECOND(S) (ref 9.1–10.9)

## 2021-05-14 ENCOUNTER — HISTORICAL (OUTPATIENT)
Dept: LAB | Facility: HOSPITAL | Age: 66
End: 2021-05-14

## 2021-05-14 LAB
ALBUMIN SERPL-MCNC: 3.4 GM/DL (ref 3.4–4.8)
ALBUMIN/GLOB SERPL: 0.7 RATIO (ref 1.1–2)
ALP SERPL-CCNC: 138 UNIT/L (ref 40–150)
ALT SERPL-CCNC: 13 UNIT/L (ref 0–55)
AST SERPL-CCNC: 15 UNIT/L (ref 5–34)
BILIRUB SERPL-MCNC: 0.7 MG/DL
BILIRUBIN DIRECT+TOT PNL SERPL-MCNC: 0.3 MG/DL
BILIRUBIN DIRECT+TOT PNL SERPL-MCNC: 0.4 MG/DL (ref 0–0.5)
BUN SERPL-MCNC: 17 MG/DL (ref 8.4–25.7)
CALCIUM SERPL-MCNC: 9.4 MG/DL (ref 8.8–10)
CHLORIDE SERPL-SCNC: 100 MMOL/L (ref 98–107)
CO2 SERPL-SCNC: 31 MEQ/L (ref 23–31)
CREAT SERPL-MCNC: 1.03 MG/DL (ref 0.73–1.18)
GLOBULIN SER-MCNC: 4.6 GM/DL (ref 2.4–3.5)
GLUCOSE SERPL-MCNC: 96 MG/DL (ref 82–115)
POTASSIUM SERPL-SCNC: 4.3 MMOL/L (ref 3.5–5.1)
PROT SERPL-MCNC: 8 GM/DL (ref 5.8–7.6)
SODIUM SERPL-SCNC: 140 MMOL/L (ref 136–145)

## 2021-06-08 ENCOUNTER — HISTORICAL (OUTPATIENT)
Dept: LAB | Facility: HOSPITAL | Age: 66
End: 2021-06-08

## 2021-06-08 LAB
INR PPP: 3.4
PROTHROMBIN TIME: 32.3 SECOND(S) (ref 9.1–10.9)

## 2021-06-28 ENCOUNTER — HISTORICAL (OUTPATIENT)
Dept: LAB | Facility: HOSPITAL | Age: 66
End: 2021-06-28

## 2021-06-28 LAB
ABS NEUT (OLG): 6.45
ALBUMIN SERPL-MCNC: 3.5 GM/DL (ref 3.4–4.8)
ALBUMIN/GLOB SERPL: 0.8 RATIO (ref 1.1–2)
ALP SERPL-CCNC: 135 UNIT/L (ref 40–150)
ALT SERPL-CCNC: 17 UNIT/L (ref 0–55)
AST SERPL-CCNC: 20 UNIT/L (ref 5–34)
BASOPHILS # BLD AUTO: 0.06 X10(3)/MCL
BASOPHILS NFR BLD AUTO: 0.5 %
BILIRUB SERPL-MCNC: 0.4 MG/DL
BILIRUBIN DIRECT+TOT PNL SERPL-MCNC: 0.2 MG/DL
BILIRUBIN DIRECT+TOT PNL SERPL-MCNC: 0.2 MG/DL (ref 0–0.5)
BUN SERPL-MCNC: 18 MG/DL (ref 8.4–25.7)
CALCIUM SERPL-MCNC: 9.8 MG/DL (ref 8.8–10)
CHLORIDE SERPL-SCNC: 100 MMOL/L (ref 98–107)
CHOLEST SERPL-MCNC: 142 MG/DL
CHOLEST/HDLC SERPL: 4 {RATIO} (ref 0–5)
CO2 SERPL-SCNC: 33 MEQ/L (ref 23–31)
CREAT SERPL-MCNC: 1.17 MG/DL (ref 0.73–1.18)
EOSINOPHIL # BLD AUTO: 0.29 X10(3)/MCL
EOSINOPHIL NFR BLD AUTO: 2.6 %
ERYTHROCYTE [DISTWIDTH] IN BLOOD BY AUTOMATED COUNT: 14 %
GLOBULIN SER-MCNC: 4.5 GM/DL (ref 2.4–3.5)
GLUCOSE SERPL-MCNC: 87 MG/DL (ref 82–115)
HCT VFR BLD AUTO: 48.3 % (ref 39–49)
HDLC SERPL-MCNC: 35 MG/DL (ref 35–60)
HGB BLD-MCNC: 16.3 GM/DL (ref 12.6–16.6)
IMM GRANULOCYTES # BLD AUTO: 0.03 10*3/UL (ref 0–0.1)
IMM GRANULOCYTES NFR BLD AUTO: 0.3 % (ref 0–1)
INR PPP: 3.9
LDLC SERPL CALC-MCNC: 80 MG/DL (ref 50–140)
LYMPHOCYTES # BLD AUTO: 3.65 X10(3)/MCL
LYMPHOCYTES NFR BLD AUTO: 32.9 %
MCH RBC QN AUTO: 31 PG (ref 27–33)
MCHC RBC AUTO-ENTMCNC: 33.7 GM/DL (ref 32–35)
MCV RBC AUTO: 91.8 FL (ref 84–97)
MONOCYTES # BLD AUTO: 0.6 X10(3)/MCL
MONOCYTES NFR BLD AUTO: 5.4 %
NEUTROPHILS # BLD AUTO: 6.45 X10(3)/MCL
NEUTROPHILS NFR BLD AUTO: 58.3 %
PLATELET # BLD AUTO: 274 X10(3)/MCL (ref 140–450)
PMV BLD AUTO: 10 FL
POTASSIUM SERPL-SCNC: 4.6 MMOL/L (ref 3.5–5.1)
PROT SERPL-MCNC: 8 GM/DL (ref 5.8–7.6)
PROTHROMBIN TIME: 36.4 SECOND(S) (ref 9.1–10.9)
PSA SERPL-MCNC: 0.77 NG/ML
RBC # BLD AUTO: 5.26 X10(6)/MCL (ref 4.3–5.6)
SODIUM SERPL-SCNC: 141 MMOL/L (ref 136–145)
TRIGL SERPL-MCNC: 137 MG/DL (ref 34–140)
VLDLC SERPL CALC-MCNC: 27 MG/DL
WBC # SPEC AUTO: 11.08 X10(3)/MCL (ref 3.4–9.2)

## 2021-07-06 ENCOUNTER — HISTORICAL (OUTPATIENT)
Dept: LAB | Facility: HOSPITAL | Age: 66
End: 2021-07-06

## 2021-07-28 ENCOUNTER — HISTORICAL (OUTPATIENT)
Dept: LAB | Facility: HOSPITAL | Age: 66
End: 2021-07-28

## 2021-07-28 LAB
INR PPP: 3.6
PROTHROMBIN TIME: 34.2 SECOND(S) (ref 9.1–10.9)

## 2021-08-27 ENCOUNTER — HISTORICAL (OUTPATIENT)
Dept: LAB | Facility: HOSPITAL | Age: 66
End: 2021-08-27

## 2021-08-27 LAB
INR PPP: 4.5
PROTHROMBIN TIME: 42 SECOND(S) (ref 9.1–10.9)

## 2021-09-01 ENCOUNTER — HISTORICAL (OUTPATIENT)
Dept: LAB | Facility: HOSPITAL | Age: 66
End: 2021-09-01

## 2021-09-01 LAB
INR PPP: 3.6
PROTHROMBIN TIME: 34.2 SECOND(S) (ref 9.1–10.9)

## 2021-09-10 ENCOUNTER — HISTORICAL (OUTPATIENT)
Dept: LAB | Facility: HOSPITAL | Age: 66
End: 2021-09-10

## 2021-09-10 LAB
INR PPP: 1.7
PROTHROMBIN TIME: 16.8 SECOND(S) (ref 9.1–10.9)

## 2021-09-17 ENCOUNTER — HISTORICAL (OUTPATIENT)
Dept: LAB | Facility: HOSPITAL | Age: 66
End: 2021-09-17

## 2021-09-17 LAB
INR PPP: 2.5
PROTHROMBIN TIME: 24 SECOND(S) (ref 9.1–10.9)

## 2021-09-30 LAB — CRC RECOMMENDATION EXT: NORMAL

## 2021-10-18 ENCOUNTER — HISTORICAL (OUTPATIENT)
Dept: LAB | Facility: HOSPITAL | Age: 66
End: 2021-10-18

## 2021-10-18 LAB
INR PPP: 3.2
PROTHROMBIN TIME: 30.4 SECOND(S) (ref 9.1–10.9)

## 2021-11-19 ENCOUNTER — HISTORICAL (OUTPATIENT)
Dept: LAB | Facility: HOSPITAL | Age: 66
End: 2021-11-19

## 2021-11-19 LAB
INR PPP: 3.2
PROTHROMBIN TIME: 30.2 SECOND(S) (ref 9.1–10.9)

## 2021-12-17 ENCOUNTER — HISTORICAL (OUTPATIENT)
Dept: LAB | Facility: HOSPITAL | Age: 66
End: 2021-12-17

## 2021-12-17 LAB
INR PPP: 4.2
PROTHROMBIN TIME: 39.2 SECOND(S) (ref 9.1–10.9)

## 2021-12-24 ENCOUNTER — HISTORICAL (OUTPATIENT)
Dept: LAB | Facility: HOSPITAL | Age: 66
End: 2021-12-24

## 2021-12-24 LAB
INR PPP: 3.8
PROTHROMBIN TIME: 36.4 SECOND(S) (ref 9.1–10.9)

## 2022-01-03 ENCOUNTER — HISTORICAL (OUTPATIENT)
Dept: LAB | Facility: HOSPITAL | Age: 67
End: 2022-01-03

## 2022-01-03 LAB
ABS NEUT (OLG): 6.04
BASOPHILS # BLD AUTO: 0.03 X10(3)/MCL
BASOPHILS NFR BLD AUTO: 0.3 %
BUN SERPL-MCNC: 21 MG/DL (ref 8.4–25.7)
CALCIUM SERPL-MCNC: 9.8 MG/DL (ref 8.7–10.5)
CHLORIDE SERPL-SCNC: 102 MMOL/L (ref 98–107)
CO2 SERPL-SCNC: 29 MEQ/L (ref 23–31)
CREAT SERPL-MCNC: 1.27 MG/DL (ref 0.73–1.18)
CREAT/UREA NIT SERPL: 17
EOSINOPHIL # BLD AUTO: 0.31 X10(3)/MCL
EOSINOPHIL NFR BLD AUTO: 2.9 %
ERYTHROCYTE [DISTWIDTH] IN BLOOD BY AUTOMATED COUNT: 14 %
GLUCOSE SERPL-MCNC: 103 MG/DL (ref 82–115)
HCT VFR BLD AUTO: 49.8 % (ref 39–49)
HGB BLD-MCNC: 17.2 GM/DL (ref 12.6–16.6)
IMM GRANULOCYTES # BLD AUTO: 0.02 10*3/UL (ref 0–0.1)
IMM GRANULOCYTES NFR BLD AUTO: 0.2 % (ref 0–1)
INR PPP: 4
LYMPHOCYTES # BLD AUTO: 3.47 X10(3)/MCL
LYMPHOCYTES NFR BLD AUTO: 32.7 %
MCH RBC QN AUTO: 31.7 PG (ref 27–33)
MCHC RBC AUTO-ENTMCNC: 34.5 GM/DL (ref 32–35)
MCV RBC AUTO: 91.7 FL (ref 84–97)
MONOCYTES # BLD AUTO: 0.74 X10(3)/MCL
MONOCYTES NFR BLD AUTO: 7 %
NEUTROPHILS # BLD AUTO: 6.04 X10(3)/MCL
NEUTROPHILS NFR BLD AUTO: 56.9 %
PLATELET # BLD AUTO: 254 X10(3)/MCL (ref 140–450)
PMV BLD AUTO: 10 FL
POTASSIUM SERPL-SCNC: 4.5 MMOL/L (ref 3.5–5.1)
PROTHROMBIN TIME: 37.5 SECOND(S) (ref 9.1–10.9)
RBC # BLD AUTO: 5.43 X10(6)/MCL (ref 4.3–5.6)
SODIUM SERPL-SCNC: 141 MMOL/L (ref 136–145)
WBC # SPEC AUTO: 10.61 X10(3)/MCL (ref 3.4–9.2)

## 2022-01-10 ENCOUNTER — HISTORICAL (OUTPATIENT)
Dept: LAB | Facility: HOSPITAL | Age: 67
End: 2022-01-10

## 2022-01-10 LAB
INR PPP: 1.7
PROTHROMBIN TIME: 16.9 SECOND(S) (ref 9.1–10.9)

## 2022-01-18 ENCOUNTER — HISTORICAL (OUTPATIENT)
Dept: LAB | Facility: HOSPITAL | Age: 67
End: 2022-01-18

## 2022-01-18 LAB
INR PPP: 1.7
PROTHROMBIN TIME: 17.1 SECOND(S) (ref 9.1–10.9)

## 2022-01-26 ENCOUNTER — HISTORICAL (OUTPATIENT)
Dept: LAB | Facility: HOSPITAL | Age: 67
End: 2022-01-26

## 2022-01-26 LAB
INR PPP: 2.6
PROTHROMBIN TIME: 25.5 SECOND(S) (ref 9.1–10.9)

## 2022-02-18 ENCOUNTER — HISTORICAL (OUTPATIENT)
Dept: LAB | Facility: HOSPITAL | Age: 67
End: 2022-02-18

## 2022-02-18 LAB
INR PPP: 2.3
PROTHROMBIN TIME: 22 S (ref 9.1–10.9)

## 2022-03-18 ENCOUNTER — HISTORICAL (OUTPATIENT)
Dept: LAB | Facility: HOSPITAL | Age: 67
End: 2022-03-18

## 2022-03-18 LAB
INR PPP: 2.5
PROTHROMBIN TIME: 23.8 S (ref 9.1–10.9)

## 2022-04-11 ENCOUNTER — HISTORICAL (OUTPATIENT)
Dept: ADMINISTRATIVE | Facility: HOSPITAL | Age: 67
End: 2022-04-11
Payer: MEDICARE

## 2022-04-20 ENCOUNTER — HISTORICAL (OUTPATIENT)
Dept: LAB | Facility: HOSPITAL | Age: 67
End: 2022-04-20
Payer: MEDICARE

## 2022-04-20 LAB
INR PPP: 2.8
PROTHROMBIN TIME: 27.1 S (ref 9.1–10.9)

## 2022-04-24 VITALS
WEIGHT: 171.94 LBS | BODY MASS INDEX: 24.61 KG/M2 | DIASTOLIC BLOOD PRESSURE: 70 MMHG | HEIGHT: 70 IN | SYSTOLIC BLOOD PRESSURE: 120 MMHG

## 2022-04-27 DIAGNOSIS — I48.91 ATRIAL FIBRILLATION, UNSPECIFIED TYPE: Primary | ICD-10-CM

## 2022-05-09 ENCOUNTER — LAB VISIT (OUTPATIENT)
Dept: LAB | Facility: HOSPITAL | Age: 67
End: 2022-05-09
Attending: INTERNAL MEDICINE
Payer: MEDICARE

## 2022-05-09 ENCOUNTER — TELEPHONE (OUTPATIENT)
Dept: FAMILY MEDICINE | Facility: CLINIC | Age: 67
End: 2022-05-09
Payer: MEDICARE

## 2022-05-09 DIAGNOSIS — R73.9 PRE-MEAL BLOOD GLUCOSE BETWEEN 8.0 AND 11.9 MMOL/L: ICD-10-CM

## 2022-05-09 DIAGNOSIS — I48.91 ATRIAL FIBRILLATION, UNSPECIFIED TYPE: ICD-10-CM

## 2022-05-09 DIAGNOSIS — E78.49 FAMILIAL COMBINED HYPERLIPIDEMIA: Primary | ICD-10-CM

## 2022-05-09 LAB
ALBUMIN SERPL-MCNC: 3.9 GM/DL (ref 3.4–4.8)
ALBUMIN/GLOB SERPL: 1 RATIO (ref 1.1–2)
ALP SERPL-CCNC: 95 UNIT/L (ref 40–150)
ALT SERPL-CCNC: 15 UNIT/L (ref 0–55)
AST SERPL-CCNC: 17 UNIT/L (ref 5–34)
BILIRUBIN DIRECT+TOT PNL SERPL-MCNC: 0.2 MG/DL (ref 0–0.5)
BILIRUBIN DIRECT+TOT PNL SERPL-MCNC: 0.3 MG/DL (ref 0–0.8)
BILIRUBIN DIRECT+TOT PNL SERPL-MCNC: 0.5 MG/DL
BUN SERPL-MCNC: 28 MG/DL (ref 8.4–25.7)
CALCIUM SERPL-MCNC: 9.6 MG/DL (ref 8.8–10)
CHLORIDE SERPL-SCNC: 100 MMOL/L (ref 98–107)
CHOLEST SERPL-MCNC: 126 MG/DL
CHOLEST/HDLC SERPL: 4 {RATIO} (ref 0–5)
CO2 SERPL-SCNC: 31 MMOL/L (ref 23–31)
CREAT SERPL-MCNC: 1.21 MG/DL (ref 0.73–1.18)
GLOBULIN SER-MCNC: 3.9 GM/DL (ref 2.4–3.5)
GLUCOSE SERPL-MCNC: 75 MG/DL (ref 82–115)
HDLC SERPL-MCNC: 33 MG/DL (ref 35–60)
INR BLD: 2.7 (ref 0–1.3)
LDLC SERPL CALC-MCNC: 59 MG/DL (ref 50–140)
POTASSIUM SERPL-SCNC: 4.5 MMOL/L (ref 3.5–5.1)
PROT SERPL-MCNC: 7.8 GM/DL (ref 5.8–7.6)
PROTHROMBIN TIME: 26.1 SECONDS (ref 9.1–10.9)
SODIUM SERPL-SCNC: 140 MMOL/L (ref 136–145)
TRIGL SERPL-MCNC: 170 MG/DL (ref 34–140)
VLDLC SERPL CALC-MCNC: 34 MG/DL

## 2022-05-09 PROCEDURE — 36415 COLL VENOUS BLD VENIPUNCTURE: CPT

## 2022-05-09 PROCEDURE — 85610 PROTHROMBIN TIME: CPT

## 2022-05-09 PROCEDURE — 80061 LIPID PANEL: CPT

## 2022-05-09 PROCEDURE — 82947 ASSAY GLUCOSE BLOOD QUANT: CPT

## 2022-05-09 NOTE — TELEPHONE ENCOUNTER
----- Message from Jimmy Mars MD sent at 5/9/2022 10:20 AM CDT -----  Please notify patient with results. Continue Coumadin

## 2022-06-08 ENCOUNTER — TELEPHONE (OUTPATIENT)
Dept: FAMILY MEDICINE | Facility: CLINIC | Age: 67
End: 2022-06-08
Payer: MEDICARE

## 2022-06-08 ENCOUNTER — LAB VISIT (OUTPATIENT)
Dept: LAB | Facility: HOSPITAL | Age: 67
End: 2022-06-08
Attending: FAMILY MEDICINE
Payer: MEDICARE

## 2022-06-08 DIAGNOSIS — I48.91 ATRIAL FIBRILLATION AND FLUTTER: Primary | ICD-10-CM

## 2022-06-08 DIAGNOSIS — I48.92 ATRIAL FIBRILLATION AND FLUTTER: ICD-10-CM

## 2022-06-08 DIAGNOSIS — I48.91 ATRIAL FIBRILLATION AND FLUTTER: ICD-10-CM

## 2022-06-08 DIAGNOSIS — I48.92 ATRIAL FIBRILLATION AND FLUTTER: Primary | ICD-10-CM

## 2022-06-08 LAB
INR BLD: 3.1 (ref 0–1.3)
PROTHROMBIN TIME: 29.7 SECONDS (ref 9.1–10.9)

## 2022-06-08 PROCEDURE — 85610 PROTHROMBIN TIME: CPT

## 2022-06-08 PROCEDURE — 36415 COLL VENOUS BLD VENIPUNCTURE: CPT

## 2022-06-08 NOTE — TELEPHONE ENCOUNTER
----- Message from Katie Quintanilla sent at 6/8/2022  8:14 AM CDT -----  Regarding: PT/INR labs  Hernandez in the Lab called in regards to an order for PT/INR  On Mr Henry.    They did not have any orders for him    She tommy a vile- and will keep it until you are able to call her and put in orders    Hernandez  [phone]

## 2022-06-30 DIAGNOSIS — K21.9 GASTROESOPHAGEAL REFLUX DISEASE, UNSPECIFIED WHETHER ESOPHAGITIS PRESENT: Primary | ICD-10-CM

## 2022-06-30 RX ORDER — PANTOPRAZOLE SODIUM 40 MG/1
TABLET, DELAYED RELEASE ORAL
Qty: 30 TABLET | Refills: 1 | Status: SHIPPED | OUTPATIENT
Start: 2022-06-30 | End: 2022-08-31

## 2022-07-11 ENCOUNTER — LAB VISIT (OUTPATIENT)
Dept: LAB | Facility: HOSPITAL | Age: 67
End: 2022-07-11
Attending: FAMILY MEDICINE
Payer: MEDICARE

## 2022-07-11 DIAGNOSIS — I48.91 ATRIAL FIBRILLATION, UNSPECIFIED TYPE: ICD-10-CM

## 2022-07-11 DIAGNOSIS — Z11.59 NEED FOR HEPATITIS C SCREENING TEST: ICD-10-CM

## 2022-07-11 DIAGNOSIS — Z12.5 SCREENING PSA (PROSTATE SPECIFIC ANTIGEN): ICD-10-CM

## 2022-07-11 DIAGNOSIS — I48.91 ATRIAL FIBRILLATION, UNSPECIFIED TYPE: Primary | ICD-10-CM

## 2022-07-11 DIAGNOSIS — Z00.00 WELLNESS EXAMINATION: Primary | ICD-10-CM

## 2022-07-11 DIAGNOSIS — Z11.4 ENCOUNTER FOR SCREENING FOR HIV: ICD-10-CM

## 2022-07-14 ENCOUNTER — LAB VISIT (OUTPATIENT)
Dept: LAB | Facility: HOSPITAL | Age: 67
End: 2022-07-14
Attending: FAMILY MEDICINE
Payer: MEDICARE

## 2022-07-14 ENCOUNTER — OFFICE VISIT (OUTPATIENT)
Dept: FAMILY MEDICINE | Facility: CLINIC | Age: 67
End: 2022-07-14
Payer: MEDICARE

## 2022-07-14 VITALS
SYSTOLIC BLOOD PRESSURE: 120 MMHG | RESPIRATION RATE: 18 BRPM | OXYGEN SATURATION: 98 % | BODY MASS INDEX: 25.48 KG/M2 | WEIGHT: 172 LBS | DIASTOLIC BLOOD PRESSURE: 78 MMHG | TEMPERATURE: 97 F | HEART RATE: 64 BPM | HEIGHT: 69 IN

## 2022-07-14 DIAGNOSIS — I48.11 LONGSTANDING PERSISTENT ATRIAL FIBRILLATION: ICD-10-CM

## 2022-07-14 DIAGNOSIS — Z11.59 NEED FOR HEPATITIS C SCREENING TEST: ICD-10-CM

## 2022-07-14 DIAGNOSIS — Z00.00 WELLNESS EXAMINATION: ICD-10-CM

## 2022-07-14 DIAGNOSIS — Z12.5 SCREENING PSA (PROSTATE SPECIFIC ANTIGEN): ICD-10-CM

## 2022-07-14 DIAGNOSIS — I73.9 PERIPHERAL VASCULAR DISEASE, UNSPECIFIED: ICD-10-CM

## 2022-07-14 DIAGNOSIS — Z00.00 WELLNESS EXAMINATION: Primary | ICD-10-CM

## 2022-07-14 DIAGNOSIS — I42.0 DILATED CARDIOMYOPATHY: ICD-10-CM

## 2022-07-14 DIAGNOSIS — Z11.4 ENCOUNTER FOR SCREENING FOR HIV: ICD-10-CM

## 2022-07-14 PROBLEM — E78.5 HYPERLIPIDEMIA: Status: ACTIVE | Noted: 2022-07-14

## 2022-07-14 PROBLEM — Z45.02 ICD (IMPLANTABLE CARDIOVERTER-DEFIBRILLATOR) BATTERY DEPLETION: Status: ACTIVE | Noted: 2022-01-11

## 2022-07-14 PROBLEM — I25.10 ARTERIOSCLEROSIS OF CORONARY ARTERY: Status: ACTIVE | Noted: 2022-07-14

## 2022-07-14 PROBLEM — I10 HYPERTENSION: Status: ACTIVE | Noted: 2022-07-14

## 2022-07-14 PROBLEM — I48.91 ATRIAL FIBRILLATION: Status: ACTIVE | Noted: 2022-07-14

## 2022-07-14 LAB
ALBUMIN SERPL-MCNC: 3.9 GM/DL (ref 3.4–4.8)
ALBUMIN/GLOB SERPL: 1 RATIO (ref 1.1–2)
ALP SERPL-CCNC: 105 UNIT/L (ref 40–150)
ALT SERPL-CCNC: 15 UNIT/L (ref 0–55)
AST SERPL-CCNC: 18 UNIT/L (ref 5–34)
BASOPHILS # BLD AUTO: 0.08 X10(3)/MCL (ref 0–0.2)
BASOPHILS NFR BLD AUTO: 0.7 %
BILIRUBIN DIRECT+TOT PNL SERPL-MCNC: 0.4 MG/DL
BUN SERPL-MCNC: 26 MG/DL (ref 8.4–25.7)
CALCIUM SERPL-MCNC: 9.8 MG/DL (ref 8.8–10)
CHLORIDE SERPL-SCNC: 101 MMOL/L (ref 98–107)
CO2 SERPL-SCNC: 29 MMOL/L (ref 23–31)
CREAT SERPL-MCNC: 1.23 MG/DL (ref 0.73–1.18)
EOSINOPHIL # BLD AUTO: 0.37 X10(3)/MCL (ref 0–0.9)
EOSINOPHIL NFR BLD AUTO: 3.4 %
ERYTHROCYTE [DISTWIDTH] IN BLOOD BY AUTOMATED COUNT: 14.1 % (ref 11.5–17)
GLOBULIN SER-MCNC: 4.1 GM/DL (ref 2.4–3.5)
GLUCOSE SERPL-MCNC: 85 MG/DL (ref 82–115)
HCT VFR BLD AUTO: 48.6 % (ref 42–52)
HCV AB SERPL QL IA: NONREACTIVE
HGB BLD-MCNC: 17 GM/DL (ref 14–18)
HIV 1+2 AB+HIV1 P24 AG SERPL QL IA: NONREACTIVE
IMM GRANULOCYTES # BLD AUTO: 0.04 X10(3)/MCL (ref 0–0.04)
IMM GRANULOCYTES NFR BLD AUTO: 0.4 %
LYMPHOCYTES # BLD AUTO: 3.82 X10(3)/MCL (ref 0.6–4.6)
LYMPHOCYTES NFR BLD AUTO: 34.8 %
MCH RBC QN AUTO: 31.1 PG (ref 27–31)
MCHC RBC AUTO-ENTMCNC: 35 MG/DL (ref 33–36)
MCV RBC AUTO: 89 FL (ref 80–94)
MONOCYTES # BLD AUTO: 0.82 X10(3)/MCL (ref 0.1–1.3)
MONOCYTES NFR BLD AUTO: 7.5 %
NEUTROPHILS # BLD AUTO: 5.9 X10(3)/MCL (ref 2.1–9.2)
NEUTROPHILS NFR BLD AUTO: 53.2 %
NRBC BLD AUTO-RTO: 0 %
PLATELET # BLD AUTO: 228 X10(3)/MCL (ref 130–400)
PMV BLD AUTO: 10.2 FL (ref 7.4–10.4)
POTASSIUM SERPL-SCNC: 4.6 MMOL/L (ref 3.5–5.1)
PROT SERPL-MCNC: 8 GM/DL (ref 5.8–7.6)
PSA SERPL-MCNC: 0.79 NG/ML
RBC # BLD AUTO: 5.46 X10(6)/MCL (ref 4.7–6.1)
SODIUM SERPL-SCNC: 139 MMOL/L (ref 136–145)
WBC # SPEC AUTO: 11 X10(3)/MCL (ref 4.5–11.5)

## 2022-07-14 PROCEDURE — 99397 PR PREVENTIVE VISIT,EST,65 & OVER: ICD-10-PCS | Mod: ,,, | Performed by: FAMILY MEDICINE

## 2022-07-14 PROCEDURE — 3008F PR BODY MASS INDEX (BMI) DOCUMENTED: ICD-10-PCS | Mod: CPTII,,, | Performed by: FAMILY MEDICINE

## 2022-07-14 PROCEDURE — 3074F PR MOST RECENT SYSTOLIC BLOOD PRESSURE < 130 MM HG: ICD-10-PCS | Mod: CPTII,,, | Performed by: FAMILY MEDICINE

## 2022-07-14 PROCEDURE — 4010F ACE/ARB THERAPY RXD/TAKEN: CPT | Mod: CPTII,,, | Performed by: FAMILY MEDICINE

## 2022-07-14 PROCEDURE — 1101F PT FALLS ASSESS-DOCD LE1/YR: CPT | Mod: CPTII,,, | Performed by: FAMILY MEDICINE

## 2022-07-14 PROCEDURE — 3078F DIAST BP <80 MM HG: CPT | Mod: CPTII,,, | Performed by: FAMILY MEDICINE

## 2022-07-14 PROCEDURE — 99397 PER PM REEVAL EST PAT 65+ YR: CPT | Mod: ,,, | Performed by: FAMILY MEDICINE

## 2022-07-14 PROCEDURE — 1159F PR MEDICATION LIST DOCUMENTED IN MEDICAL RECORD: ICD-10-PCS | Mod: CPTII,,, | Performed by: FAMILY MEDICINE

## 2022-07-14 PROCEDURE — 36415 COLL VENOUS BLD VENIPUNCTURE: CPT

## 2022-07-14 PROCEDURE — 3008F BODY MASS INDEX DOCD: CPT | Mod: CPTII,,, | Performed by: FAMILY MEDICINE

## 2022-07-14 PROCEDURE — 1159F MED LIST DOCD IN RCRD: CPT | Mod: CPTII,,, | Performed by: FAMILY MEDICINE

## 2022-07-14 PROCEDURE — 3074F SYST BP LT 130 MM HG: CPT | Mod: CPTII,,, | Performed by: FAMILY MEDICINE

## 2022-07-14 PROCEDURE — 80053 COMPREHEN METABOLIC PANEL: CPT

## 2022-07-14 PROCEDURE — 3288F FALL RISK ASSESSMENT DOCD: CPT | Mod: CPTII,,, | Performed by: FAMILY MEDICINE

## 2022-07-14 PROCEDURE — 3078F PR MOST RECENT DIASTOLIC BLOOD PRESSURE < 80 MM HG: ICD-10-PCS | Mod: CPTII,,, | Performed by: FAMILY MEDICINE

## 2022-07-14 PROCEDURE — 84153 ASSAY OF PSA TOTAL: CPT

## 2022-07-14 PROCEDURE — 1160F PR REVIEW ALL MEDS BY PRESCRIBER/CLIN PHARMACIST DOCUMENTED: ICD-10-PCS | Mod: CPTII,,, | Performed by: FAMILY MEDICINE

## 2022-07-14 PROCEDURE — 86803 HEPATITIS C AB TEST: CPT

## 2022-07-14 PROCEDURE — 3288F PR FALLS RISK ASSESSMENT DOCUMENTED: ICD-10-PCS | Mod: CPTII,,, | Performed by: FAMILY MEDICINE

## 2022-07-14 PROCEDURE — 85025 COMPLETE CBC W/AUTO DIFF WBC: CPT

## 2022-07-14 PROCEDURE — 4010F PR ACE/ARB THEARPY RXD/TAKEN: ICD-10-PCS | Mod: CPTII,,, | Performed by: FAMILY MEDICINE

## 2022-07-14 PROCEDURE — 1160F RVW MEDS BY RX/DR IN RCRD: CPT | Mod: CPTII,,, | Performed by: FAMILY MEDICINE

## 2022-07-14 PROCEDURE — 1158F ADVNC CARE PLAN TLK DOCD: CPT | Mod: CPTII,,, | Performed by: FAMILY MEDICINE

## 2022-07-14 PROCEDURE — 1158F PR ADVANCE CARE PLANNING DISCUSS DOCUMENTED IN MEDICAL RECORD: ICD-10-PCS | Mod: CPTII,,, | Performed by: FAMILY MEDICINE

## 2022-07-14 PROCEDURE — 1101F PR PT FALLS ASSESS DOC 0-1 FALLS W/OUT INJ PAST YR: ICD-10-PCS | Mod: CPTII,,, | Performed by: FAMILY MEDICINE

## 2022-07-14 PROCEDURE — 87389 HIV-1 AG W/HIV-1&-2 AB AG IA: CPT

## 2022-07-14 RX ORDER — HYDROCHLOROTHIAZIDE 25 MG/1
TABLET ORAL
COMMUNITY
Start: 2022-07-05

## 2022-07-14 RX ORDER — OLMESARTAN MEDOXOMIL 40 MG/1
TABLET ORAL
COMMUNITY
Start: 2022-07-05

## 2022-07-14 RX ORDER — ROSUVASTATIN CALCIUM 40 MG/1
40 TABLET, COATED ORAL
COMMUNITY
Start: 2022-03-04

## 2022-07-14 RX ORDER — SOTALOL HYDROCHLORIDE 80 MG/1
TABLET ORAL
COMMUNITY
Start: 2022-07-05

## 2022-07-14 RX ORDER — AMLODIPINE BESYLATE 5 MG/1
5 TABLET ORAL
COMMUNITY

## 2022-07-14 NOTE — PROGRESS NOTES
"Subjective:       Patient ID: Carl Mccollum Sr is a 66 y.o. male.    Chief Complaint: WELLNESS      Wellness      Review of Systems   Constitutional: Negative.    HENT: Negative.    Respiratory: Negative.    Cardiovascular: Negative.         Afib: followed by Dr Alberto   Gastrointestinal: Negative.    Genitourinary: Negative.    Musculoskeletal: Negative.    Integumentary:  Negative.   Psychiatric/Behavioral: Negative.            Objective:      /78 (BP Location: Left arm, Patient Position: Sitting, BP Method: Large (Manual))   Pulse 64   Temp 96.9 °F (36.1 °C)   Resp 18   Ht 5' 9" (1.753 m)   Wt 78 kg (172 lb)   SpO2 98%   BMI 25.40 kg/m²    Physical Exam  Constitutional:       General: He is not in acute distress.     Appearance: Normal appearance.   Cardiovascular:      Rate and Rhythm: Normal rate and regular rhythm.   Pulmonary:      Effort: Pulmonary effort is normal.      Breath sounds: Normal breath sounds.   Abdominal:      General: Abdomen is flat. Bowel sounds are normal.      Palpations: Abdomen is soft.   Musculoskeletal:         General: Normal range of motion.   Neurological:      General: No focal deficit present.      Mental Status: He is alert and oriented to person, place, and time.   Psychiatric:         Mood and Affect: Mood normal.         Behavior: Behavior normal.         Thought Content: Thought content normal.         Judgment: Judgment normal.             Assessment:       Problem List Items Addressed This Visit        Cardiac/Vascular    Atrial fibrillation    Dilated cardiomyopathy    Peripheral vascular disease, unspecified      Other Visit Diagnoses     Wellness examination    -  Primary           Plan:     1. Wellness  Lab work reviewed with patient  Continue current medication  Continue diet/exercise  Advanced directive discussed with patient  Keep scheduled appointment with Dr Alberto  Return to clinic with any concerns    Advance Care Planning     Date: 07/14/2022    Living " Will  During this visit, I engaged the patient  in the advance care planning process.  The patient and I reviewed the role for advance directives and their purpose in directing future healthcare if the patient's unable to speak for him/herself.  At this point in time, the patient does have full decision-making capacity.  We discussed different extreme health states that he could experience, and reviewed what kind of medical care he would want in those situations.  The patient communicated that if he were comatose and had little chance of a meaningful recovery, he would not want machines/life-sustaining treatments used.  I spent a total of 5 minutes engaging the patient in this advance care planning discussion.

## 2022-08-09 ENCOUNTER — LAB VISIT (OUTPATIENT)
Dept: LAB | Facility: HOSPITAL | Age: 67
End: 2022-08-09
Attending: FAMILY MEDICINE
Payer: MEDICARE

## 2022-08-09 DIAGNOSIS — I48.91 ATRIAL FIBRILLATION, UNSPECIFIED TYPE: ICD-10-CM

## 2022-08-09 DIAGNOSIS — I48.11 LONGSTANDING PERSISTENT ATRIAL FIBRILLATION: Primary | ICD-10-CM

## 2022-08-09 LAB
INR BLD: 2.6 (ref 0–1.3)
PROTHROMBIN TIME: 25.3 SECONDS (ref 9.1–10.9)

## 2022-08-09 PROCEDURE — 36415 COLL VENOUS BLD VENIPUNCTURE: CPT | Performed by: FAMILY MEDICINE

## 2022-09-09 ENCOUNTER — LAB VISIT (OUTPATIENT)
Dept: LAB | Facility: HOSPITAL | Age: 67
End: 2022-09-09
Attending: FAMILY MEDICINE
Payer: MEDICARE

## 2022-09-09 DIAGNOSIS — I48.11 LONGSTANDING PERSISTENT ATRIAL FIBRILLATION: ICD-10-CM

## 2022-09-09 DIAGNOSIS — I48.11 LONGSTANDING PERSISTENT ATRIAL FIBRILLATION: Primary | ICD-10-CM

## 2022-09-09 LAB
INR BLD: 4 (ref 0–1.3)
PROTHROMBIN TIME: 38.1 SECONDS (ref 9.1–10.9)

## 2022-09-09 PROCEDURE — 36415 COLL VENOUS BLD VENIPUNCTURE: CPT

## 2022-09-09 PROCEDURE — 85610 PROTHROMBIN TIME: CPT

## 2022-09-16 ENCOUNTER — LAB VISIT (OUTPATIENT)
Dept: LAB | Facility: HOSPITAL | Age: 67
End: 2022-09-16
Attending: FAMILY MEDICINE
Payer: MEDICARE

## 2022-09-16 DIAGNOSIS — I48.11 LONGSTANDING PERSISTENT ATRIAL FIBRILLATION: ICD-10-CM

## 2022-09-16 LAB
INR BLD: 2.1 (ref 0–1.3)
PROTHROMBIN TIME: 20.5 SECONDS (ref 9.1–10.9)

## 2022-09-16 PROCEDURE — 36415 COLL VENOUS BLD VENIPUNCTURE: CPT

## 2022-09-16 PROCEDURE — 85610 PROTHROMBIN TIME: CPT

## 2022-09-26 ENCOUNTER — APPOINTMENT (OUTPATIENT)
Dept: LAB | Facility: HOSPITAL | Age: 67
End: 2022-09-26
Attending: FAMILY MEDICINE
Payer: MEDICARE

## 2022-09-26 PROCEDURE — 36415 COLL VENOUS BLD VENIPUNCTURE: CPT | Performed by: FAMILY MEDICINE

## 2022-10-10 ENCOUNTER — LAB VISIT (OUTPATIENT)
Dept: LAB | Facility: HOSPITAL | Age: 67
End: 2022-10-10
Attending: FAMILY MEDICINE
Payer: MEDICARE

## 2022-10-10 DIAGNOSIS — I48.11 LONGSTANDING PERSISTENT ATRIAL FIBRILLATION: ICD-10-CM

## 2022-10-10 LAB
INR BLD: 2.5 (ref 0–1.3)
PROTHROMBIN TIME: 24.4 SECONDS (ref 9.1–10.9)

## 2022-10-10 PROCEDURE — 85610 PROTHROMBIN TIME: CPT

## 2022-10-10 PROCEDURE — 36415 COLL VENOUS BLD VENIPUNCTURE: CPT

## 2022-11-09 ENCOUNTER — LAB VISIT (OUTPATIENT)
Dept: LAB | Facility: HOSPITAL | Age: 67
End: 2022-11-09
Attending: FAMILY MEDICINE
Payer: MEDICARE

## 2022-11-09 DIAGNOSIS — I48.11 LONGSTANDING PERSISTENT ATRIAL FIBRILLATION: ICD-10-CM

## 2022-11-09 LAB
INR BLD: 2.3 (ref 0–1.3)
PROTHROMBIN TIME: 22.8 SECONDS (ref 9.1–10.9)

## 2022-11-09 PROCEDURE — 36415 COLL VENOUS BLD VENIPUNCTURE: CPT

## 2022-11-09 PROCEDURE — 85610 PROTHROMBIN TIME: CPT

## 2022-12-02 ENCOUNTER — OFFICE VISIT (OUTPATIENT)
Dept: FAMILY MEDICINE | Facility: CLINIC | Age: 67
End: 2022-12-02
Payer: MEDICARE

## 2022-12-02 VITALS
RESPIRATION RATE: 18 BRPM | BODY MASS INDEX: 26.96 KG/M2 | DIASTOLIC BLOOD PRESSURE: 72 MMHG | WEIGHT: 182 LBS | HEART RATE: 66 BPM | SYSTOLIC BLOOD PRESSURE: 128 MMHG | HEIGHT: 69 IN | TEMPERATURE: 97 F | OXYGEN SATURATION: 96 %

## 2022-12-02 DIAGNOSIS — E78.5 HYPERLIPIDEMIA, UNSPECIFIED HYPERLIPIDEMIA TYPE: ICD-10-CM

## 2022-12-02 DIAGNOSIS — I10 HYPERTENSION, UNSPECIFIED TYPE: ICD-10-CM

## 2022-12-02 DIAGNOSIS — I48.91 ATRIAL FIBRILLATION, UNSPECIFIED TYPE: Primary | ICD-10-CM

## 2022-12-02 PROCEDURE — 1101F PR PT FALLS ASSESS DOC 0-1 FALLS W/OUT INJ PAST YR: ICD-10-PCS | Mod: CPTII,,, | Performed by: FAMILY MEDICINE

## 2022-12-02 PROCEDURE — 3074F SYST BP LT 130 MM HG: CPT | Mod: CPTII,,, | Performed by: FAMILY MEDICINE

## 2022-12-02 PROCEDURE — 99214 PR OFFICE/OUTPT VISIT, EST, LEVL IV, 30-39 MIN: ICD-10-PCS | Mod: ,,, | Performed by: FAMILY MEDICINE

## 2022-12-02 PROCEDURE — 3078F DIAST BP <80 MM HG: CPT | Mod: CPTII,,, | Performed by: FAMILY MEDICINE

## 2022-12-02 PROCEDURE — 1159F MED LIST DOCD IN RCRD: CPT | Mod: CPTII,,, | Performed by: FAMILY MEDICINE

## 2022-12-02 PROCEDURE — 3008F PR BODY MASS INDEX (BMI) DOCUMENTED: ICD-10-PCS | Mod: CPTII,,, | Performed by: FAMILY MEDICINE

## 2022-12-02 PROCEDURE — 1101F PT FALLS ASSESS-DOCD LE1/YR: CPT | Mod: CPTII,,, | Performed by: FAMILY MEDICINE

## 2022-12-02 PROCEDURE — 4010F PR ACE/ARB THEARPY RXD/TAKEN: ICD-10-PCS | Mod: CPTII,,, | Performed by: FAMILY MEDICINE

## 2022-12-02 PROCEDURE — 3078F PR MOST RECENT DIASTOLIC BLOOD PRESSURE < 80 MM HG: ICD-10-PCS | Mod: CPTII,,, | Performed by: FAMILY MEDICINE

## 2022-12-02 PROCEDURE — 3008F BODY MASS INDEX DOCD: CPT | Mod: CPTII,,, | Performed by: FAMILY MEDICINE

## 2022-12-02 PROCEDURE — 1160F PR REVIEW ALL MEDS BY PRESCRIBER/CLIN PHARMACIST DOCUMENTED: ICD-10-PCS | Mod: CPTII,,, | Performed by: FAMILY MEDICINE

## 2022-12-02 PROCEDURE — 3288F FALL RISK ASSESSMENT DOCD: CPT | Mod: CPTII,,, | Performed by: FAMILY MEDICINE

## 2022-12-02 PROCEDURE — 1125F AMNT PAIN NOTED PAIN PRSNT: CPT | Mod: CPTII,,, | Performed by: FAMILY MEDICINE

## 2022-12-02 PROCEDURE — 3074F PR MOST RECENT SYSTOLIC BLOOD PRESSURE < 130 MM HG: ICD-10-PCS | Mod: CPTII,,, | Performed by: FAMILY MEDICINE

## 2022-12-02 PROCEDURE — 1159F PR MEDICATION LIST DOCUMENTED IN MEDICAL RECORD: ICD-10-PCS | Mod: CPTII,,, | Performed by: FAMILY MEDICINE

## 2022-12-02 PROCEDURE — 99214 OFFICE O/P EST MOD 30 MIN: CPT | Mod: ,,, | Performed by: FAMILY MEDICINE

## 2022-12-02 PROCEDURE — 3288F PR FALLS RISK ASSESSMENT DOCUMENTED: ICD-10-PCS | Mod: CPTII,,, | Performed by: FAMILY MEDICINE

## 2022-12-02 PROCEDURE — 1125F PR PAIN SEVERITY QUANTIFIED, PAIN PRESENT: ICD-10-PCS | Mod: CPTII,,, | Performed by: FAMILY MEDICINE

## 2022-12-02 PROCEDURE — 1160F RVW MEDS BY RX/DR IN RCRD: CPT | Mod: CPTII,,, | Performed by: FAMILY MEDICINE

## 2022-12-02 PROCEDURE — 4010F ACE/ARB THERAPY RXD/TAKEN: CPT | Mod: CPTII,,, | Performed by: FAMILY MEDICINE

## 2022-12-02 RX ORDER — ASPIRIN 325 MG
50 TABLET, DELAYED RELEASE (ENTERIC COATED) ORAL DAILY
COMMUNITY

## 2022-12-02 RX ORDER — NITROGLYCERIN 0.4 MG/1
0.4 TABLET SUBLINGUAL EVERY 5 MIN PRN
COMMUNITY

## 2022-12-02 NOTE — PROGRESS NOTES
"Subjective:       Patient ID: Carl Mccollum Sr is a 66 y.o. male.    Chief Complaint: 4 M F/U and Gout      Routine    Hypertension  - tolerating medication (no side effects)  - no headaches  - patient without any complaints    Hyperlipidemia  - tolerating medication (no side effects)   - no myalgia  - watching diet    Review of Systems   Constitutional: Negative.    Respiratory: Negative.     Cardiovascular: Negative.         Afib: followed by Dr Alberto   Gastrointestinal: Negative.    Genitourinary: Negative.          Objective:      /72 (BP Location: Left arm, Patient Position: Sitting, BP Method: Large (Manual))   Pulse 66   Temp 96.8 °F (36 °C)   Resp 18   Ht 5' 9" (1.753 m)   Wt 82.6 kg (182 lb)   SpO2 96%   BMI 26.88 kg/m²    Physical Exam  Constitutional:       Appearance: Normal appearance.   Cardiovascular:      Rate and Rhythm: Normal rate. Rhythm irregular.      Heart sounds: Normal heart sounds.   Pulmonary:      Effort: Pulmonary effort is normal.      Breath sounds: Normal breath sounds.   Neurological:      Mental Status: He is alert.   Psychiatric:         Mood and Affect: Mood normal.         Behavior: Behavior normal.         Thought Content: Thought content normal.         Judgment: Judgment normal.           Assessment:       Problem List Items Addressed This Visit          Cardiac/Vascular    Atrial fibrillation - Primary    Hyperlipidemia    Hypertension          Plan:   1. Atrial fibrillation, unspecified type  Controlled  Continue current Rx medication  Keep scheduled appointment with Dr Alberto  Return to clinic with any concerns    2. Hypertension, unspecified type  Controlled  Continue current Rx medication  Return to clinic with any concerns    3. Hyperlipidemia, unspecified hyperlipidemia type  Continue current Rx medication  FLP prior to next visit  Return to clinic with any concerns        "

## 2022-12-09 ENCOUNTER — LAB VISIT (OUTPATIENT)
Dept: LAB | Facility: HOSPITAL | Age: 67
End: 2022-12-09
Attending: FAMILY MEDICINE
Payer: MEDICARE

## 2022-12-09 DIAGNOSIS — I48.11 LONGSTANDING PERSISTENT ATRIAL FIBRILLATION: ICD-10-CM

## 2022-12-09 LAB
INR BLD: 2.4 (ref 0–1.3)
PROTHROMBIN TIME: 23.7 SECONDS (ref 9.1–10.9)

## 2022-12-09 PROCEDURE — 36415 COLL VENOUS BLD VENIPUNCTURE: CPT

## 2022-12-09 PROCEDURE — 85610 PROTHROMBIN TIME: CPT

## 2022-12-12 ENCOUNTER — TELEPHONE (OUTPATIENT)
Dept: FAMILY MEDICINE | Facility: CLINIC | Age: 67
End: 2022-12-12
Payer: MEDICARE

## 2022-12-12 NOTE — TELEPHONE ENCOUNTER
----- Message from Jimmy Mars MD sent at 12/12/2022 12:57 PM CST -----  Please inform patient of lab results, which are all within acceptable ranges.

## 2023-01-10 ENCOUNTER — LAB VISIT (OUTPATIENT)
Dept: LAB | Facility: HOSPITAL | Age: 68
End: 2023-01-10
Attending: FAMILY MEDICINE
Payer: MEDICARE

## 2023-01-10 DIAGNOSIS — I48.11 LONGSTANDING PERSISTENT ATRIAL FIBRILLATION: ICD-10-CM

## 2023-01-10 LAB
INR BLD: 3.6 (ref 0–1.3)
PROTHROMBIN TIME: 34.9 SECONDS (ref 9.1–10.9)

## 2023-01-10 PROCEDURE — 36415 COLL VENOUS BLD VENIPUNCTURE: CPT

## 2023-01-10 PROCEDURE — 85610 PROTHROMBIN TIME: CPT

## 2023-01-11 ENCOUNTER — TELEPHONE (OUTPATIENT)
Dept: FAMILY MEDICINE | Facility: CLINIC | Age: 68
End: 2023-01-11
Payer: MEDICARE

## 2023-01-11 NOTE — TELEPHONE ENCOUNTER
----- Message from Jimmy Mars MD sent at 1/10/2023  5:26 PM CST -----  Please inform patient of lab results, which are all within acceptable ranges.

## 2023-02-10 ENCOUNTER — LAB VISIT (OUTPATIENT)
Dept: LAB | Facility: HOSPITAL | Age: 68
End: 2023-02-10
Attending: FAMILY MEDICINE
Payer: MEDICARE

## 2023-02-10 DIAGNOSIS — I48.11 LONGSTANDING PERSISTENT ATRIAL FIBRILLATION: ICD-10-CM

## 2023-02-10 LAB
INR BLD: 2.7 (ref 0–1.3)
PROTHROMBIN TIME: 26 SECONDS (ref 9.1–10.9)

## 2023-02-10 PROCEDURE — 85610 PROTHROMBIN TIME: CPT

## 2023-02-10 PROCEDURE — 36415 COLL VENOUS BLD VENIPUNCTURE: CPT

## 2023-03-09 ENCOUNTER — LAB VISIT (OUTPATIENT)
Dept: LAB | Facility: HOSPITAL | Age: 68
End: 2023-03-09
Attending: FAMILY MEDICINE
Payer: MEDICARE

## 2023-03-09 DIAGNOSIS — I48.11 LONGSTANDING PERSISTENT ATRIAL FIBRILLATION: ICD-10-CM

## 2023-03-09 LAB
INR BLD: 2.6 (ref 0–1.3)
PROTHROMBIN TIME: 25.2 SECONDS (ref 9.1–10.9)

## 2023-03-09 PROCEDURE — 36415 COLL VENOUS BLD VENIPUNCTURE: CPT

## 2023-03-09 PROCEDURE — 85610 PROTHROMBIN TIME: CPT

## 2023-04-10 ENCOUNTER — TELEPHONE (OUTPATIENT)
Dept: FAMILY MEDICINE | Facility: CLINIC | Age: 68
End: 2023-04-10
Payer: MEDICARE

## 2023-04-10 ENCOUNTER — LAB VISIT (OUTPATIENT)
Dept: LAB | Facility: HOSPITAL | Age: 68
End: 2023-04-10
Attending: FAMILY MEDICINE
Payer: MEDICARE

## 2023-04-10 DIAGNOSIS — I48.11 LONGSTANDING PERSISTENT ATRIAL FIBRILLATION: ICD-10-CM

## 2023-04-10 LAB
INR BLD: 3.3 (ref 0–1.3)
PROTHROMBIN TIME: 31.6 SECONDS (ref 9.1–10.9)

## 2023-04-10 PROCEDURE — 36415 COLL VENOUS BLD VENIPUNCTURE: CPT

## 2023-04-10 PROCEDURE — 85610 PROTHROMBIN TIME: CPT

## 2023-04-10 NOTE — TELEPHONE ENCOUNTER
----- Message from Jimmy Mars MD sent at 4/10/2023  4:39 PM CDT -----  Please inform patient of lab results, which are all within acceptable ranges.

## 2023-05-09 ENCOUNTER — TELEPHONE (OUTPATIENT)
Dept: FAMILY MEDICINE | Facility: CLINIC | Age: 68
End: 2023-05-09
Payer: MEDICARE

## 2023-05-09 ENCOUNTER — LAB VISIT (OUTPATIENT)
Dept: LAB | Facility: HOSPITAL | Age: 68
End: 2023-05-09
Attending: FAMILY MEDICINE
Payer: MEDICARE

## 2023-05-09 DIAGNOSIS — I48.11 LONGSTANDING PERSISTENT ATRIAL FIBRILLATION: ICD-10-CM

## 2023-05-09 LAB
INR BLD: 2.8 (ref 0–1.3)
PROTHROMBIN TIME: 27.2 SECONDS (ref 9.1–10.9)

## 2023-05-09 PROCEDURE — 36415 COLL VENOUS BLD VENIPUNCTURE: CPT

## 2023-05-09 PROCEDURE — 85610 PROTHROMBIN TIME: CPT

## 2023-05-09 NOTE — TELEPHONE ENCOUNTER
----- Message from Jimmy Mars MD sent at 5/9/2023 11:51 AM CDT -----  Please inform patient of lab results, which are all within acceptable ranges.

## 2023-06-12 ENCOUNTER — TELEPHONE (OUTPATIENT)
Dept: FAMILY MEDICINE | Facility: CLINIC | Age: 68
End: 2023-06-12
Payer: MEDICARE

## 2023-06-12 ENCOUNTER — APPOINTMENT (OUTPATIENT)
Dept: LAB | Facility: HOSPITAL | Age: 68
End: 2023-06-12
Attending: FAMILY MEDICINE
Payer: MEDICARE

## 2023-06-12 NOTE — TELEPHONE ENCOUNTER
----- Message from Jimmy Mars MD sent at 6/12/2023  5:11 PM CDT -----  Please inform patient of lab results, which are all within acceptable ranges.

## 2023-06-22 DIAGNOSIS — Z13.6 SCREENING FOR ISCHEMIC HEART DISEASE: ICD-10-CM

## 2023-06-22 DIAGNOSIS — Z11.4 ENCOUNTER FOR HIV (HUMAN IMMUNODEFICIENCY VIRUS) TEST: Primary | ICD-10-CM

## 2023-06-22 DIAGNOSIS — Z11.59 NEED FOR HEPATITIS C SCREENING TEST: ICD-10-CM

## 2023-06-22 DIAGNOSIS — E11.9 TYPE 2 DIABETES MELLITUS WITHOUT COMPLICATION, WITHOUT LONG-TERM CURRENT USE OF INSULIN: ICD-10-CM

## 2023-06-22 DIAGNOSIS — I10 HYPERTENSION, UNSPECIFIED TYPE: ICD-10-CM

## 2023-06-22 DIAGNOSIS — Z00.00 WELLNESS EXAMINATION: ICD-10-CM

## 2023-06-22 DIAGNOSIS — E78.5 HYPERLIPIDEMIA, UNSPECIFIED HYPERLIPIDEMIA TYPE: ICD-10-CM

## 2023-06-26 DIAGNOSIS — K21.9 GASTROESOPHAGEAL REFLUX DISEASE, UNSPECIFIED WHETHER ESOPHAGITIS PRESENT: ICD-10-CM

## 2023-06-26 RX ORDER — PANTOPRAZOLE SODIUM 40 MG/1
TABLET, DELAYED RELEASE ORAL
Qty: 30 TABLET | Refills: 1 | Status: SHIPPED | OUTPATIENT
Start: 2023-06-26 | End: 2023-07-27

## 2023-06-27 ENCOUNTER — PATIENT OUTREACH (OUTPATIENT)
Dept: ADMINISTRATIVE | Facility: HOSPITAL | Age: 68
End: 2023-06-27
Payer: MEDICARE

## 2023-06-27 NOTE — PROGRESS NOTES
The following record(s)  below were uploaded for Health Maintenance .    COLONOSCOPY     9/30/2021

## 2023-06-27 NOTE — LETTER
AUTHORIZATION FOR RELEASE OF   CONFIDENTIAL INFORMATION    [facility]  Fax [phone]    We are seeing Carl Mccollum Sr, date of birth 1955, in the clinic at [facility]. Jimmy Mars MD is the patient's PCP. Carl Mccollum Sr has an outstanding lab/procedure at the time we reviewed his chart. In order to help keep his health information updated, he has authorized us to request the following medical record(s):        (  )  MAMMOGRAM                                      ( X )  COLONOSCOPY      (  )  PAP SMEAR                                          (  )  OUTSIDE LAB RESULTS     (  )  DEXA SCAN                                          (  )  EYE EXAM            (  )  FOOT EXAM                                          (  )  ENTIRE RECORD     (  )  OUTSIDE IMMUNIZATIONS                 (  )  _______________         Please fax records to [facility], Scott James Bergeaux, MD, [phone] or [phone].       If you have any questions you can contact Jessika Espinoza at [phone].         Patient Name: Carl Mccollum Sr  : 1955  Patient Phone #: [phone]

## 2023-06-27 NOTE — PROGRESS NOTES
Population Health. Out Reach. Reviewing patient's chart for quality metrics. Records request sent to [facility] for Colonoscopy.

## 2023-06-30 DIAGNOSIS — E11.9 TYPE 2 DIABETES MELLITUS WITHOUT COMPLICATION, WITHOUT LONG-TERM CURRENT USE OF INSULIN: Primary | ICD-10-CM

## 2023-07-05 DIAGNOSIS — I48.91 ATRIAL FIBRILLATION, UNSPECIFIED TYPE: ICD-10-CM

## 2023-07-05 RX ORDER — WARFARIN 4 MG/1
TABLET ORAL
Qty: 30 TABLET | Refills: 2 | Status: SHIPPED | OUTPATIENT
Start: 2023-07-05 | End: 2023-09-29

## 2023-07-11 ENCOUNTER — LAB VISIT (OUTPATIENT)
Dept: LAB | Facility: HOSPITAL | Age: 68
End: 2023-07-11
Attending: FAMILY MEDICINE
Payer: MEDICARE

## 2023-07-11 DIAGNOSIS — I48.91 ATRIAL FIBRILLATION, UNSPECIFIED TYPE: ICD-10-CM

## 2023-07-11 DIAGNOSIS — Z11.4 ENCOUNTER FOR HIV (HUMAN IMMUNODEFICIENCY VIRUS) TEST: ICD-10-CM

## 2023-07-11 DIAGNOSIS — Z11.59 NEED FOR HEPATITIS C SCREENING TEST: ICD-10-CM

## 2023-07-11 DIAGNOSIS — E11.9 TYPE 2 DIABETES MELLITUS WITHOUT COMPLICATION, WITHOUT LONG-TERM CURRENT USE OF INSULIN: ICD-10-CM

## 2023-07-11 DIAGNOSIS — I10 HYPERTENSION, UNSPECIFIED TYPE: ICD-10-CM

## 2023-07-11 DIAGNOSIS — I48.91 ATRIAL FIBRILLATION, UNSPECIFIED TYPE: Primary | ICD-10-CM

## 2023-07-11 DIAGNOSIS — E78.5 HYPERLIPIDEMIA, UNSPECIFIED HYPERLIPIDEMIA TYPE: ICD-10-CM

## 2023-07-11 DIAGNOSIS — Z00.00 WELLNESS EXAMINATION: ICD-10-CM

## 2023-07-11 DIAGNOSIS — Z13.6 SCREENING FOR ISCHEMIC HEART DISEASE: ICD-10-CM

## 2023-07-11 LAB
ALBUMIN SERPL-MCNC: 3.7 G/DL (ref 3.4–4.8)
ALBUMIN/GLOB SERPL: 0.9 RATIO (ref 1.1–2)
ALP SERPL-CCNC: 86 UNIT/L (ref 40–150)
ALT SERPL-CCNC: 12 UNIT/L (ref 0–55)
AST SERPL-CCNC: 20 UNIT/L (ref 5–34)
BASOPHILS # BLD AUTO: 0.13 X10(3)/MCL
BASOPHILS NFR BLD AUTO: 1 %
BILIRUBIN DIRECT+TOT PNL SERPL-MCNC: 0.5 MG/DL
BUN SERPL-MCNC: 18 MG/DL (ref 8.4–25.7)
CALCIUM SERPL-MCNC: 9.9 MG/DL (ref 8.8–10)
CHLORIDE SERPL-SCNC: 102 MMOL/L (ref 98–107)
CHOLEST SERPL-MCNC: 119 MG/DL
CHOLEST/HDLC SERPL: 4 {RATIO} (ref 0–5)
CO2 SERPL-SCNC: 29 MMOL/L (ref 23–31)
CREAT SERPL-MCNC: 1.1 MG/DL (ref 0.73–1.18)
EOSINOPHIL # BLD AUTO: 0.38 X10(3)/MCL (ref 0–0.9)
EOSINOPHIL NFR BLD AUTO: 3 %
ERYTHROCYTE [DISTWIDTH] IN BLOOD BY AUTOMATED COUNT: 14.2 % (ref 11.5–17)
EST. AVERAGE GLUCOSE BLD GHB EST-MCNC: 114 MG/DL
GFR SERPLBLD CREATININE-BSD FMLA CKD-EPI: >60 MLS/MIN/1.73/M2
GLOBULIN SER-MCNC: 3.9 GM/DL (ref 2.4–3.5)
GLUCOSE SERPL-MCNC: 75 MG/DL (ref 82–115)
HBA1C MFR BLD: 5.6 %
HCT VFR BLD AUTO: 46.6 % (ref 42–52)
HCV AB SERPL QL IA: NONREACTIVE
HDLC SERPL-MCNC: 33 MG/DL (ref 35–60)
HGB BLD-MCNC: 16.3 G/DL (ref 14–18)
HIV 1+2 AB+HIV1 P24 AG SERPL QL IA: NONREACTIVE
IMM GRANULOCYTES # BLD AUTO: 0.04 X10(3)/MCL (ref 0–0.04)
IMM GRANULOCYTES NFR BLD AUTO: 0.3 %
INR BLD: 2.7 (ref 0–1.3)
LDLC SERPL CALC-MCNC: 62 MG/DL (ref 50–140)
LYMPHOCYTES # BLD AUTO: 3.57 X10(3)/MCL (ref 0.6–4.6)
LYMPHOCYTES NFR BLD AUTO: 28.6 %
MCH RBC QN AUTO: 31 PG (ref 27–31)
MCHC RBC AUTO-ENTMCNC: 35 G/DL (ref 33–36)
MCV RBC AUTO: 88.8 FL (ref 80–94)
MONOCYTES # BLD AUTO: 1.05 X10(3)/MCL (ref 0.1–1.3)
MONOCYTES NFR BLD AUTO: 8.4 %
NEUTROPHILS # BLD AUTO: 7.33 X10(3)/MCL (ref 2.1–9.2)
NEUTROPHILS NFR BLD AUTO: 58.7 %
NRBC BLD AUTO-RTO: 0 %
PLATELET # BLD AUTO: 233 X10(3)/MCL (ref 130–400)
PMV BLD AUTO: 9.7 FL (ref 7.4–10.4)
POTASSIUM SERPL-SCNC: 3.9 MMOL/L (ref 3.5–5.1)
PROT SERPL-MCNC: 7.6 GM/DL (ref 5.8–7.6)
PROTHROMBIN TIME: 26.6 SECONDS (ref 9.1–10.9)
RBC # BLD AUTO: 5.25 X10(6)/MCL (ref 4.7–6.1)
SODIUM SERPL-SCNC: 137 MMOL/L (ref 136–145)
TRIGL SERPL-MCNC: 119 MG/DL (ref 34–140)
VLDLC SERPL CALC-MCNC: 24 MG/DL
WBC # SPEC AUTO: 12.5 X10(3)/MCL (ref 4.5–11.5)

## 2023-07-11 PROCEDURE — 86803 HEPATITIS C AB TEST: CPT

## 2023-07-11 PROCEDURE — 87389 HIV-1 AG W/HIV-1&-2 AB AG IA: CPT

## 2023-07-11 PROCEDURE — 36415 COLL VENOUS BLD VENIPUNCTURE: CPT

## 2023-07-11 PROCEDURE — 85025 COMPLETE CBC W/AUTO DIFF WBC: CPT

## 2023-07-11 PROCEDURE — 80061 LIPID PANEL: CPT

## 2023-07-11 PROCEDURE — 85610 PROTHROMBIN TIME: CPT

## 2023-07-11 PROCEDURE — 83036 HEMOGLOBIN GLYCOSYLATED A1C: CPT

## 2023-07-11 PROCEDURE — 80053 COMPREHEN METABOLIC PANEL: CPT

## 2023-07-25 ENCOUNTER — OFFICE VISIT (OUTPATIENT)
Dept: FAMILY MEDICINE | Facility: CLINIC | Age: 68
End: 2023-07-25
Payer: MEDICARE

## 2023-07-25 VITALS
SYSTOLIC BLOOD PRESSURE: 122 MMHG | HEIGHT: 69 IN | OXYGEN SATURATION: 97 % | BODY MASS INDEX: 26.86 KG/M2 | DIASTOLIC BLOOD PRESSURE: 74 MMHG | TEMPERATURE: 96 F | WEIGHT: 181.38 LBS | RESPIRATION RATE: 20 BRPM | HEART RATE: 73 BPM

## 2023-07-25 DIAGNOSIS — Z00.00 WELLNESS EXAMINATION: Primary | ICD-10-CM

## 2023-07-25 DIAGNOSIS — Z12.5 SCREENING PSA (PROSTATE SPECIFIC ANTIGEN): ICD-10-CM

## 2023-07-25 PROCEDURE — 1159F MED LIST DOCD IN RCRD: CPT | Mod: CPTII,,, | Performed by: FAMILY MEDICINE

## 2023-07-25 PROCEDURE — 4010F ACE/ARB THERAPY RXD/TAKEN: CPT | Mod: CPTII,,, | Performed by: FAMILY MEDICINE

## 2023-07-25 PROCEDURE — 1158F PR ADVANCE CARE PLANNING DISCUSS DOCUMENTED IN MEDICAL RECORD: ICD-10-PCS | Mod: CPTII,,, | Performed by: FAMILY MEDICINE

## 2023-07-25 PROCEDURE — 3288F PR FALLS RISK ASSESSMENT DOCUMENTED: ICD-10-PCS | Mod: CPTII,,, | Performed by: FAMILY MEDICINE

## 2023-07-25 PROCEDURE — 1160F RVW MEDS BY RX/DR IN RCRD: CPT | Mod: CPTII,,, | Performed by: FAMILY MEDICINE

## 2023-07-25 PROCEDURE — 1159F PR MEDICATION LIST DOCUMENTED IN MEDICAL RECORD: ICD-10-PCS | Mod: CPTII,,, | Performed by: FAMILY MEDICINE

## 2023-07-25 PROCEDURE — 3078F DIAST BP <80 MM HG: CPT | Mod: CPTII,,, | Performed by: FAMILY MEDICINE

## 2023-07-25 PROCEDURE — 3074F PR MOST RECENT SYSTOLIC BLOOD PRESSURE < 130 MM HG: ICD-10-PCS | Mod: CPTII,,, | Performed by: FAMILY MEDICINE

## 2023-07-25 PROCEDURE — 3078F PR MOST RECENT DIASTOLIC BLOOD PRESSURE < 80 MM HG: ICD-10-PCS | Mod: CPTII,,, | Performed by: FAMILY MEDICINE

## 2023-07-25 PROCEDURE — 3008F BODY MASS INDEX DOCD: CPT | Mod: CPTII,,, | Performed by: FAMILY MEDICINE

## 2023-07-25 PROCEDURE — 3044F HG A1C LEVEL LT 7.0%: CPT | Mod: CPTII,,, | Performed by: FAMILY MEDICINE

## 2023-07-25 PROCEDURE — 1101F PR PT FALLS ASSESS DOC 0-1 FALLS W/OUT INJ PAST YR: ICD-10-PCS | Mod: CPTII,,, | Performed by: FAMILY MEDICINE

## 2023-07-25 PROCEDURE — 1160F PR REVIEW ALL MEDS BY PRESCRIBER/CLIN PHARMACIST DOCUMENTED: ICD-10-PCS | Mod: CPTII,,, | Performed by: FAMILY MEDICINE

## 2023-07-25 PROCEDURE — 1101F PT FALLS ASSESS-DOCD LE1/YR: CPT | Mod: CPTII,,, | Performed by: FAMILY MEDICINE

## 2023-07-25 PROCEDURE — G0439 PPPS, SUBSEQ VISIT: HCPCS | Mod: ,,, | Performed by: FAMILY MEDICINE

## 2023-07-25 PROCEDURE — 3008F PR BODY MASS INDEX (BMI) DOCUMENTED: ICD-10-PCS | Mod: CPTII,,, | Performed by: FAMILY MEDICINE

## 2023-07-25 PROCEDURE — 1158F ADVNC CARE PLAN TLK DOCD: CPT | Mod: CPTII,,, | Performed by: FAMILY MEDICINE

## 2023-07-25 PROCEDURE — 3044F PR MOST RECENT HEMOGLOBIN A1C LEVEL <7.0%: ICD-10-PCS | Mod: CPTII,,, | Performed by: FAMILY MEDICINE

## 2023-07-25 PROCEDURE — 4010F PR ACE/ARB THEARPY RXD/TAKEN: ICD-10-PCS | Mod: CPTII,,, | Performed by: FAMILY MEDICINE

## 2023-07-25 PROCEDURE — G0439 PR MEDICARE ANNUAL WELLNESS SUBSEQUENT VISIT: ICD-10-PCS | Mod: ,,, | Performed by: FAMILY MEDICINE

## 2023-07-25 PROCEDURE — 3288F FALL RISK ASSESSMENT DOCD: CPT | Mod: CPTII,,, | Performed by: FAMILY MEDICINE

## 2023-07-25 PROCEDURE — 3074F SYST BP LT 130 MM HG: CPT | Mod: CPTII,,, | Performed by: FAMILY MEDICINE

## 2023-07-25 NOTE — PROGRESS NOTES
Patient ID: [id number]     Chief Complaint: Medicare AWV      HPI:     Carl Mccollum Sr is a 67 y.o. male here today for a Medicare Wellness.       Opioid Screening: Patient medication list reviewed, patient is not taking prescription opioids. Patient is not using additional opioids than prescribed. Patient is not at low risk of substance abuse based on this opioid use history.       ----------------------------  Hypertension  Paroxysmal atrial fibrillation  Personal history of colonic polyps     Past Surgical History:   Procedure Laterality Date    COLONOSCOPY  09/30/2021    Dr. Tony    CORONARY ANGIOPLASTY WITH STENT PLACEMENT         Review of patient's allergies indicates:  No Known Allergies    Outpatient Medications Marked as Taking for the 7/25/23 encounter (Office Visit) with Jimmy Mars MD   Medication Sig Dispense Refill    amLODIPine (NORVASC) 5 MG tablet Take 5 mg by mouth.      co-enzyme Q-10 50 mg capsule Take 50 mg by mouth once daily.      hydroCHLOROthiazide (HYDRODIURIL) 25 MG tablet       nitroGLYCERIN (NITROSTAT) 0.4 MG SL tablet Place 0.4 mg under the tongue every 5 (five) minutes as needed for Chest pain.      olmesartan (BENICAR) 40 MG tablet       pantoprazole (PROTONIX) 40 MG tablet TAKE 1 TABLET BY MOUTH DAILY FOR STOMACH 30 tablet 1    rosuvastatin (CRESTOR) 40 MG Tab Take 40 mg by mouth.      sotaloL (BETAPACE) 80 MG tablet       warfarin (COUMADIN) 4 MG tablet TAKE ONE TABLET BY MOUTH DAILY 30 tablet 2       Social History     Socioeconomic History    Marital status: Single   Tobacco Use    Smoking status: Every Day     Packs/day: 0.50     Types: Cigarettes    Smokeless tobacco: Never   Substance and Sexual Activity    Alcohol use: Yes    Drug use: Not Currently    Sexual activity: Not Currently     Social Determinants of Health     Financial Resource Strain: Low Risk     Difficulty of Paying Living Expenses: Not hard at all   Food Insecurity: No Food Insecurity    Worried About  Running Out of Food in the Last Year: Never true    Ran Out of Food in the Last Year: Never true   Transportation Needs: No Transportation Needs    Lack of Transportation (Medical): No    Lack of Transportation (Non-Medical): No   Physical Activity: Sufficiently Active    Days of Exercise per Week: 5 days    Minutes of Exercise per Session: 30 min   Stress: No Stress Concern Present    Feeling of Stress : Not at all   Social Connections: Moderately Isolated    Frequency of Communication with Friends and Family: More than three times a week    Frequency of Social Gatherings with Friends and Family: More than three times a week    Attends Muslim Services: 1 to 4 times per year    Active Member of Clubs or Organizations: No    Attends Club or Organization Meetings: Never    Marital Status: Never    Housing Stability: Low Risk     Unable to Pay for Housing in the Last Year: No    Number of Places Lived in the Last Year: 1    Unstable Housing in the Last Year: No        Family History   Problem Relation Age of Onset    Kidney disease Mother     Heart disease Mother     Heart disease Father         Patient Care Team:  Jimmy Mars MD as PCP - General (Family Medicine)  MD Douglas Mackay MD as Consulting Physician (Cardiology)  Christo Tony MD as Consulting Physician (Gastroenterology)       Subjective:     Review of Systems   Constitutional: Negative.    Respiratory: Negative.     Cardiovascular: Negative.    Gastrointestinal: Negative.    Psychiatric/Behavioral: Negative.         Patient Reported Health Risk Assessment  What [age] your age?: 65-69  Are you male or female?: Male  During the past four weeks, how much have you been bothered by emotional problems such as feeling anxious, depressed, irritable, sad, or downhearted and blue?: Not at all  During the past five weeks, has your physical and/or emotional health limited your social activities with family, friends, neighbors, or groups?:  Not at all  During the past four weeks, how much bodily pain have you generally had?: No pain  During the past four weeks, was someone available to help if you needed and wanted help?: Yes, as much as I wanted  During the past four weeks, what was the hardest physical activity you could do for at least two minutes?: Heavy  Can you get to places out of walking distance without help?  (For example, can you travel alone on buses or taxis, or drive your own car?): Yes  Can you go shopping for groceries or clothes without someone's help?: Yes  Can you prepare your own meals?: Yes  Can you do your own housework without help?: Yes  Because of any health problems, do you need the help of another person with your personal care needs such as eating, bathing, dressing, or getting around the house?: No  Can you handle your own money without help?: Yes  During the past four weeks, how would you rate your health in general?: Very good  How have things been going for you during the past four weeks?: Very well  Are you having difficulties driving your car?: No  Do you always fasten your seat belt when you are in a car?: Yes, usually  How often in the past four weeks have you been bothered by falling or dizzy when standing up?: Never  How often in the past four weeks have you been bothered by sexual problems?: Never  How often in the past four weeks have you been bothered by trouble eating well?: Never  How often in the past four weeks have you been bothered by teeth or denture problems?: Never  How often in the past four weeks have you been bothered with problems using the telephone?: Never  How often in the past four weeks have you been bothered by tiredness or fatigue?: Never  Have you fallen two or more times in the past year?: No  Are you afraid of falling?: No  Are you a smoker?: Yes, I'm not ready to quit  During the past four weeks, how many drinks of wine, beer, or other alcoholic beverages did you have?: No alcohol at  "all  Do you exercise for about 20 minutes three or more days a week?: Yes, some of the time  Have you been given any information to help you with hazards in your house that might hurt you?: Yes  Have you been given any information to help you with keeping track of your medications?: Yes  How often do you have trouble taking medicines the way you've been told to take them?: I always take them as prescribed  How confident are you that you can control and manage most of your health problems?: Very confident  What is your race? (Check all that apply.):     Objective:     /74 (BP Location: Left arm, Patient Position: Sitting, BP Method: Large (Manual))   Pulse 73   Temp 96.3 °F (35.7 °C)   Resp 20   Ht 5' 9" (1.753 m)   Wt 82.3 kg (181 lb 6.4 oz)   SpO2 97%   BMI 26.79 kg/m²     Physical Exam  Constitutional:       Appearance: Normal appearance.   Cardiovascular:      Rate and Rhythm: Normal rate and regular rhythm.   Pulmonary:      Effort: Pulmonary effort is normal.      Breath sounds: Normal breath sounds.   Abdominal:      General: Abdomen is flat. Bowel sounds are normal.      Palpations: Abdomen is soft.   Neurological:      Mental Status: He is alert.   Psychiatric:         Mood and Affect: Mood normal.         Behavior: Behavior normal.         Thought Content: Thought content normal.         Judgment: Judgment normal.         No flowsheet data found.  Fall Risk Assessment - Outpatient 7/25/2023 12/2/2022 7/14/2022   Mobility Status Ambulatory Ambulatory Ambulatory   Number of falls 0 0 0   Identified as fall risk 0 0 0           Depression Screening  Over the past two weeks, has the patient felt down, depressed, or hopeless?: No  Over the past two weeks, has the patient felt little interest or pleasure in doing things?: No  Functional Ability/Safety Screening  Was the patient's timed Up & Go test unsteady or longer than 30 seconds?: No  Does the patient need help with phone, " transportation, shopping, preparing meals, housework, laundry, meds, or managing money?: No  Does the patient's home have rugs in the hallway, lack grab bars in the bathroom, lack handrails on the stairs or have poor lighting?: No  Have you noticed any hearing difficulties?: No  Cognitive Function (Assessed through direct observation with due consideration of information obtained by way of patient reports and/or concerns raised by family, friends, caretakers, or others)    Does the patient repeat questions/statements in the same day?: No  Does the patient have trouble remembering the date, year, and time?: No  Does the patient have difficulty managing finances?: No  Does the patient have a decreased sense of direction?: No  Assessment/Plan:     1. Wellness examination  -     PSA, Screening; Future; Expected date: 07/25/2023  Lab work reviewed with patient  Continue current medication  Continue diet/exercise  Advanced directive discussed with patient  Return to clinic with any concerns    Advance Care Planning     Date: 07/25/2023    Living Will  During this visit, I engaged the patient  in the voluntary advance care planning process.  The patient and I reviewed the role for advance directives and their purpose in directing future healthcare if the patient's unable to speak for him/herself.  At this point in time, the patient does have full decision-making capacity.  We discussed different extreme health states that he could experience, and reviewed what kind of medical care he would want in those situations.  The patient communicated that if he were comatose and had little chance of a meaningful recovery, he would not want machines/life-sustaining treatments used. I spent a total of 5 minutes engaging the patient in this advance care planning discussion.     2. Screening PSA (prostate specific antigen)  -     PSA, Screening; Future; Expected date: 07/25/2023  Check PSA         Medicare Annual Wellness and  Personalized Prevention Plan:   Fall Risk + Home Safety + Hearing Impairment + Depression Screen + Opioid and Substance Abuse Screening + Cognitive Impairment Screen + Health Risk Assessment all reviewed.     Health Maintenance Topics with due status: Not Due       Topic Last Completion Date    Colorectal Cancer Screening 09/30/2021    Hemoglobin A1c (Diabetic Prevention Screening) 07/11/2023    Lipid Panel 07/11/2023    High Dose Statin 07/25/2023    Influenza Vaccine Not Due      The patient's Health Maintenance was reviewed and the following appears to be due at this time:   Health Maintenance Due   Topic Date Due    COVID-19 Vaccine (1) Never done    Pneumococcal Vaccines (Age 65+) (1 - PCV) Never done    TETANUS VACCINE  Never done    Shingles Vaccine (1 of 2) Never done       Advance Care Planning   I attest to discussing Advance Care Planning with patient and/or family member.  Education was provided including the importance of the Health Care Power of , Advance Directives, and/or [place] documentation.  The patient expressed understanding to the importance of this information and discussion.         Follow up in about 6 months (around 1/25/2024). In addition to their scheduled follow up, the patient has also been instructed to follow up on as needed basis.

## 2023-07-26 ENCOUNTER — LAB VISIT (OUTPATIENT)
Dept: LAB | Facility: HOSPITAL | Age: 68
End: 2023-07-26
Attending: FAMILY MEDICINE
Payer: MEDICARE

## 2023-07-26 DIAGNOSIS — Z12.5 SCREENING PSA (PROSTATE SPECIFIC ANTIGEN): ICD-10-CM

## 2023-07-26 DIAGNOSIS — Z00.00 WELLNESS EXAMINATION: ICD-10-CM

## 2023-07-26 LAB — PSA SERPL-MCNC: 0.87 NG/ML

## 2023-07-26 PROCEDURE — 36415 COLL VENOUS BLD VENIPUNCTURE: CPT

## 2023-07-26 PROCEDURE — 84153 ASSAY OF PSA TOTAL: CPT

## 2023-07-27 DIAGNOSIS — K21.9 GASTROESOPHAGEAL REFLUX DISEASE, UNSPECIFIED WHETHER ESOPHAGITIS PRESENT: ICD-10-CM

## 2023-07-27 RX ORDER — PANTOPRAZOLE SODIUM 40 MG/1
TABLET, DELAYED RELEASE ORAL
Qty: 30 TABLET | Refills: 1 | Status: SHIPPED | OUTPATIENT
Start: 2023-07-27 | End: 2023-09-25

## 2023-08-08 ENCOUNTER — APPOINTMENT (OUTPATIENT)
Dept: LAB | Facility: HOSPITAL | Age: 68
End: 2023-08-08
Attending: FAMILY MEDICINE
Payer: MEDICARE

## 2023-08-09 ENCOUNTER — TELEPHONE (OUTPATIENT)
Dept: FAMILY MEDICINE | Facility: CLINIC | Age: 68
End: 2023-08-09
Payer: MEDICARE

## 2023-08-09 NOTE — TELEPHONE ENCOUNTER
----- Message from Jimmy Mars MD sent at 8/8/2023  5:52 PM CDT -----  Please inform patient of results.     1. Hold Coumadin.  Repeat INR in 2 days

## 2023-08-11 ENCOUNTER — LAB VISIT (OUTPATIENT)
Dept: LAB | Facility: HOSPITAL | Age: 68
End: 2023-08-11
Attending: FAMILY MEDICINE
Payer: MEDICARE

## 2023-08-11 DIAGNOSIS — I48.11 LONGSTANDING PERSISTENT ATRIAL FIBRILLATION: ICD-10-CM

## 2023-08-11 LAB
INR PPP: 1.8
PROTHROMBIN TIME: 17.9 SECONDS (ref 9.1–10.9)

## 2023-08-11 PROCEDURE — 85610 PROTHROMBIN TIME: CPT

## 2023-08-11 PROCEDURE — 36415 COLL VENOUS BLD VENIPUNCTURE: CPT

## 2023-08-11 NOTE — PROGRESS NOTES
Please inform patient of results.     1. Increased Coumadin by 1 mg.  Repeat INR in 1 week    Other labwork within acceptable ranges.

## 2023-08-18 ENCOUNTER — LAB VISIT (OUTPATIENT)
Dept: LAB | Facility: HOSPITAL | Age: 68
End: 2023-08-18
Attending: FAMILY MEDICINE
Payer: MEDICARE

## 2023-08-18 ENCOUNTER — TELEPHONE (OUTPATIENT)
Dept: FAMILY MEDICINE | Facility: CLINIC | Age: 68
End: 2023-08-18
Payer: MEDICARE

## 2023-08-18 DIAGNOSIS — I48.11 LONGSTANDING PERSISTENT ATRIAL FIBRILLATION: ICD-10-CM

## 2023-08-18 LAB
INR PPP: 2.3
PROTHROMBIN TIME: 22.8 SECONDS (ref 9.1–10.9)

## 2023-08-18 PROCEDURE — 85610 PROTHROMBIN TIME: CPT

## 2023-08-18 PROCEDURE — 36415 COLL VENOUS BLD VENIPUNCTURE: CPT

## 2023-08-18 NOTE — TELEPHONE ENCOUNTER
----- Message from Jimmy Mars MD sent at 8/18/2023 10:28 AM CDT -----  Please inform patient of results.     1. Increase Coumadin by 1 mg.  Repeat INR in 1 week    Other labwork within acceptable ranges.

## 2023-08-18 NOTE — PROGRESS NOTES
Please inform patient of results.     1. Increase Coumadin by 1 mg.  Repeat INR in 1 week    Other labwork within acceptable ranges.

## 2023-08-22 ENCOUNTER — OFFICE VISIT (OUTPATIENT)
Dept: FAMILY MEDICINE | Facility: CLINIC | Age: 68
End: 2023-08-22
Payer: MEDICARE

## 2023-08-22 VITALS — BODY MASS INDEX: 26.73 KG/M2 | WEIGHT: 181 LBS

## 2023-08-22 DIAGNOSIS — I48.91 ATRIAL FIBRILLATION, UNSPECIFIED TYPE: Primary | ICD-10-CM

## 2023-08-22 PROCEDURE — 3288F PR FALLS RISK ASSESSMENT DOCUMENTED: ICD-10-PCS | Mod: CPTII,95,, | Performed by: FAMILY MEDICINE

## 2023-08-22 PROCEDURE — 3288F FALL RISK ASSESSMENT DOCD: CPT | Mod: CPTII,95,, | Performed by: FAMILY MEDICINE

## 2023-08-22 PROCEDURE — 3008F PR BODY MASS INDEX (BMI) DOCUMENTED: ICD-10-PCS | Mod: CPTII,95,, | Performed by: FAMILY MEDICINE

## 2023-08-22 PROCEDURE — 3044F HG A1C LEVEL LT 7.0%: CPT | Mod: CPTII,95,, | Performed by: FAMILY MEDICINE

## 2023-08-22 PROCEDURE — 1159F PR MEDICATION LIST DOCUMENTED IN MEDICAL RECORD: ICD-10-PCS | Mod: CPTII,95,, | Performed by: FAMILY MEDICINE

## 2023-08-22 PROCEDURE — 3044F PR MOST RECENT HEMOGLOBIN A1C LEVEL <7.0%: ICD-10-PCS | Mod: CPTII,95,, | Performed by: FAMILY MEDICINE

## 2023-08-22 PROCEDURE — 99213 PR OFFICE/OUTPT VISIT, EST, LEVL III, 20-29 MIN: ICD-10-PCS | Mod: 95,,, | Performed by: FAMILY MEDICINE

## 2023-08-22 PROCEDURE — 99213 OFFICE O/P EST LOW 20 MIN: CPT | Mod: 95,,, | Performed by: FAMILY MEDICINE

## 2023-08-22 PROCEDURE — 1101F PR PT FALLS ASSESS DOC 0-1 FALLS W/OUT INJ PAST YR: ICD-10-PCS | Mod: CPTII,95,, | Performed by: FAMILY MEDICINE

## 2023-08-22 PROCEDURE — 1160F RVW MEDS BY RX/DR IN RCRD: CPT | Mod: CPTII,95,, | Performed by: FAMILY MEDICINE

## 2023-08-22 PROCEDURE — 4010F ACE/ARB THERAPY RXD/TAKEN: CPT | Mod: CPTII,95,, | Performed by: FAMILY MEDICINE

## 2023-08-22 PROCEDURE — 1160F PR REVIEW ALL MEDS BY PRESCRIBER/CLIN PHARMACIST DOCUMENTED: ICD-10-PCS | Mod: CPTII,95,, | Performed by: FAMILY MEDICINE

## 2023-08-22 PROCEDURE — 4010F PR ACE/ARB THEARPY RXD/TAKEN: ICD-10-PCS | Mod: CPTII,95,, | Performed by: FAMILY MEDICINE

## 2023-08-22 PROCEDURE — 1101F PT FALLS ASSESS-DOCD LE1/YR: CPT | Mod: CPTII,95,, | Performed by: FAMILY MEDICINE

## 2023-08-22 PROCEDURE — 3008F BODY MASS INDEX DOCD: CPT | Mod: CPTII,95,, | Performed by: FAMILY MEDICINE

## 2023-08-22 PROCEDURE — 1159F MED LIST DOCD IN RCRD: CPT | Mod: CPTII,95,, | Performed by: FAMILY MEDICINE

## 2023-08-22 NOTE — PROGRESS NOTES
Subjective:       Patient ID: Carl Mccollum Sr is a 67 y.o. male.    Chief Complaint: discuss PT/INR management      Routine        Review of Systems   Constitutional:  Negative for activity change and unexpected weight change.   HENT:  Negative for hearing loss, rhinorrhea and trouble swallowing.    Eyes:  Negative for discharge and visual disturbance.   Respiratory:  Negative for chest tightness and wheezing.    Cardiovascular:  Negative for chest pain and palpitations.   Gastrointestinal:  Negative for blood in stool, constipation, diarrhea and vomiting.   Endocrine: Negative for polydipsia and polyuria.   Genitourinary:  Negative for difficulty urinating, hematuria and urgency.   Musculoskeletal:  Negative for arthralgias, joint swelling and neck pain.   Neurological:  Negative for weakness and headaches.   Psychiatric/Behavioral:  Negative for confusion and dysphoric mood.            Objective:      Wt 82.1 kg (181 lb)   BMI 26.73 kg/m²    Physical Exam  General:  Alert oriented, no acute distress  Neurologic:  Alert, oriented  Psychiatric:  Cooperative, appropriate mood and affect, normal judgment      The patient location is: [place]  The chief complaint leading to consultation is:  AFib    Visit type: audiovisual    Face to Face time with patient: 15 minutes of total time spent on the encounter, which includes face to face time and non-face to face time preparing to see the patient (eg, review of tests), Obtaining and/or reviewing separately obtained history, Documenting clinical information in the electronic or other health record, Independently interpreting results (not separately reported) and communicating results to the patient/family/caregiver, or Care coordination (not separately reported).         Each patient to whom he or she provides medical services by telemedicine is:  (1) informed of the relationship between the physician and patient and the respective role of any other health care provider  with respect to management of the patient; and (2) notified that he or she may decline to receive medical services by telemedicine and may withdraw from such care at any time.          Assessment:       Problem List Items Addressed This Visit          Cardiac/Vascular    Atrial fibrillation - Primary          Plan:   1. Atrial fibrillation, unspecified type  Lab work discussed with patient   Continue Coumadin 4 mg q.day   Discussed diet  Repeat INR on 8/25/2023

## 2023-08-25 ENCOUNTER — TELEPHONE (OUTPATIENT)
Dept: FAMILY MEDICINE | Facility: CLINIC | Age: 68
End: 2023-08-25
Payer: MEDICARE

## 2023-08-25 ENCOUNTER — LAB VISIT (OUTPATIENT)
Dept: LAB | Facility: HOSPITAL | Age: 68
End: 2023-08-25
Attending: FAMILY MEDICINE
Payer: MEDICARE

## 2023-08-25 DIAGNOSIS — I48.91 ATRIAL FIBRILLATION, UNSPECIFIED TYPE: ICD-10-CM

## 2023-08-25 LAB
INR PPP: 2
PROTHROMBIN TIME: 20.2 SECONDS (ref 9.1–10.9)

## 2023-08-25 PROCEDURE — 85610 PROTHROMBIN TIME: CPT

## 2023-08-25 PROCEDURE — 36415 COLL VENOUS BLD VENIPUNCTURE: CPT

## 2023-08-25 NOTE — TELEPHONE ENCOUNTER
----- Message from Jimmy Mars MD sent at 8/25/2023 10:27 AM CDT -----  Please inform patient of lab results, which are all within acceptable ranges.  INR range 2-3. Repeat INR in 1 week

## 2023-08-25 NOTE — PROGRESS NOTES
Please inform patient of lab results, which are all within acceptable ranges.  INR range 2-3. Repeat INR in 1 week

## 2023-09-01 ENCOUNTER — LAB VISIT (OUTPATIENT)
Dept: LAB | Facility: HOSPITAL | Age: 68
End: 2023-09-01
Attending: FAMILY MEDICINE
Payer: MEDICARE

## 2023-09-01 DIAGNOSIS — I48.91 ATRIAL FIBRILLATION, UNSPECIFIED TYPE: ICD-10-CM

## 2023-09-01 LAB
INR PPP: 4.1
PROTHROMBIN TIME: 39.4 SECONDS (ref 9.1–10.9)

## 2023-09-01 PROCEDURE — 85610 PROTHROMBIN TIME: CPT

## 2023-09-01 PROCEDURE — 36415 COLL VENOUS BLD VENIPUNCTURE: CPT

## 2023-09-08 ENCOUNTER — TELEPHONE (OUTPATIENT)
Dept: FAMILY MEDICINE | Facility: CLINIC | Age: 68
End: 2023-09-08
Payer: MEDICARE

## 2023-09-08 ENCOUNTER — LAB VISIT (OUTPATIENT)
Dept: LAB | Facility: HOSPITAL | Age: 68
End: 2023-09-08
Attending: FAMILY MEDICINE
Payer: MEDICARE

## 2023-09-08 DIAGNOSIS — I48.11 LONGSTANDING PERSISTENT ATRIAL FIBRILLATION: ICD-10-CM

## 2023-09-08 LAB
INR PPP: 1.7
PROTHROMBIN TIME: 16.9 SECONDS (ref 9.1–10.9)

## 2023-09-08 PROCEDURE — 85610 PROTHROMBIN TIME: CPT

## 2023-09-08 PROCEDURE — 36415 COLL VENOUS BLD VENIPUNCTURE: CPT

## 2023-09-08 NOTE — PROGRESS NOTES
Please inform patient of results.     1. Increase Coumadin to 5 mg q.day.  Repeat INR in 1 week    Other labwork within acceptable ranges.

## 2023-09-08 NOTE — TELEPHONE ENCOUNTER
----- Message from Jimmy Mars MD sent at 9/8/2023  9:42 AM CDT -----  Please inform patient of results.     1. Increase Coumadin to 5 mg q.day.  Repeat INR in 1 week    Other labwork within acceptable ranges.

## 2023-09-15 ENCOUNTER — LAB VISIT (OUTPATIENT)
Dept: LAB | Facility: HOSPITAL | Age: 68
End: 2023-09-15
Attending: FAMILY MEDICINE
Payer: MEDICARE

## 2023-09-15 DIAGNOSIS — I48.11 LONGSTANDING PERSISTENT ATRIAL FIBRILLATION: ICD-10-CM

## 2023-09-15 LAB
INR PPP: 5.4
PROTHROMBIN TIME: 51.2 SECONDS (ref 9.1–10.9)

## 2023-09-15 PROCEDURE — 36415 COLL VENOUS BLD VENIPUNCTURE: CPT

## 2023-09-15 PROCEDURE — 85610 PROTHROMBIN TIME: CPT

## 2023-09-18 ENCOUNTER — LAB VISIT (OUTPATIENT)
Dept: LAB | Facility: HOSPITAL | Age: 68
End: 2023-09-18
Attending: FAMILY MEDICINE
Payer: MEDICARE

## 2023-09-18 ENCOUNTER — TELEPHONE (OUTPATIENT)
Dept: FAMILY MEDICINE | Facility: CLINIC | Age: 68
End: 2023-09-18
Payer: MEDICARE

## 2023-09-18 DIAGNOSIS — I48.91 ATRIAL FIBRILLATION, UNSPECIFIED TYPE: ICD-10-CM

## 2023-09-18 LAB
INR PPP: 1.7
PROTHROMBIN TIME: 16.9 SECONDS (ref 9.1–10.9)

## 2023-09-18 PROCEDURE — 85610 PROTHROMBIN TIME: CPT

## 2023-09-18 PROCEDURE — 36415 COLL VENOUS BLD VENIPUNCTURE: CPT

## 2023-09-18 NOTE — PROGRESS NOTES
Please inform patient of results.     1. Restart Coumadin.  Repeat INR in 1 week    Other labwork within acceptable ranges.

## 2023-09-18 NOTE — TELEPHONE ENCOUNTER
Informed pt of results and medication regimen. Pt v/u. Will present to lab in 1 week for f/u INR.

## 2023-09-18 NOTE — TELEPHONE ENCOUNTER
----- Message from Jimmy Mars MD sent at 9/18/2023 12:01 PM CDT -----  Please inform patient of results.     1. Restart Coumadin.  Repeat INR in 1 week    Other labwork within acceptable ranges.

## 2023-09-25 ENCOUNTER — TELEPHONE (OUTPATIENT)
Dept: FAMILY MEDICINE | Facility: CLINIC | Age: 68
End: 2023-09-25
Payer: MEDICARE

## 2023-09-25 ENCOUNTER — LAB VISIT (OUTPATIENT)
Dept: LAB | Facility: HOSPITAL | Age: 68
End: 2023-09-25
Attending: FAMILY MEDICINE
Payer: MEDICARE

## 2023-09-25 DIAGNOSIS — I48.91 ATRIAL FIBRILLATION, UNSPECIFIED TYPE: ICD-10-CM

## 2023-09-25 DIAGNOSIS — K21.9 GASTROESOPHAGEAL REFLUX DISEASE, UNSPECIFIED WHETHER ESOPHAGITIS PRESENT: ICD-10-CM

## 2023-09-25 LAB
INR PPP: 2.9
PROTHROMBIN TIME: 28.5 SECONDS (ref 9.1–10.9)

## 2023-09-25 PROCEDURE — 85610 PROTHROMBIN TIME: CPT

## 2023-09-25 PROCEDURE — 36415 COLL VENOUS BLD VENIPUNCTURE: CPT

## 2023-09-25 RX ORDER — PANTOPRAZOLE SODIUM 40 MG/1
TABLET, DELAYED RELEASE ORAL
Qty: 30 TABLET | Refills: 1 | Status: SHIPPED | OUTPATIENT
Start: 2023-09-25 | End: 2023-11-27

## 2023-09-25 NOTE — TELEPHONE ENCOUNTER
----- Message from Jimmy Mars MD sent at 9/25/2023  1:06 PM CDT -----  Please inform patient of lab results, which are all within acceptable ranges.

## 2023-09-29 DIAGNOSIS — I48.91 ATRIAL FIBRILLATION, UNSPECIFIED TYPE: ICD-10-CM

## 2023-09-29 RX ORDER — WARFARIN 4 MG/1
TABLET ORAL
Qty: 30 TABLET | Refills: 2 | Status: SHIPPED | OUTPATIENT
Start: 2023-09-29 | End: 2024-01-16

## 2023-10-09 ENCOUNTER — LAB VISIT (OUTPATIENT)
Dept: LAB | Facility: HOSPITAL | Age: 68
End: 2023-10-09
Attending: FAMILY MEDICINE
Payer: MEDICARE

## 2023-10-09 DIAGNOSIS — I48.91 ATRIAL FIBRILLATION, UNSPECIFIED TYPE: ICD-10-CM

## 2023-10-09 LAB
INR PPP: 2.7
PROTHROMBIN TIME: 26.5 SECONDS (ref 9.1–10.9)

## 2023-10-09 PROCEDURE — 85610 PROTHROMBIN TIME: CPT

## 2023-10-09 PROCEDURE — 36415 COLL VENOUS BLD VENIPUNCTURE: CPT

## 2023-10-09 NOTE — PROGRESS NOTES
Please inform patient of lab results, which are all within acceptable ranges.  Continue Coumadin.  Repeat INR in 2 weeks

## 2023-10-10 ENCOUNTER — TELEPHONE (OUTPATIENT)
Dept: FAMILY MEDICINE | Facility: CLINIC | Age: 68
End: 2023-10-10
Payer: MEDICARE

## 2023-10-10 NOTE — TELEPHONE ENCOUNTER
----- Message from Jimmy Mars MD sent at 10/9/2023 10:06 AM CDT -----  Please inform patient of lab results, which are all within acceptable ranges.  Continue Coumadin.  Repeat INR in 2 weeks

## 2023-10-23 ENCOUNTER — LAB VISIT (OUTPATIENT)
Dept: LAB | Facility: HOSPITAL | Age: 68
End: 2023-10-23
Attending: FAMILY MEDICINE
Payer: MEDICARE

## 2023-10-23 DIAGNOSIS — I48.91 ATRIAL FIBRILLATION, UNSPECIFIED TYPE: ICD-10-CM

## 2023-10-23 LAB
INR PPP: 3.6
PROTHROMBIN TIME: 34.5 SECONDS (ref 9.1–10.9)

## 2023-10-23 PROCEDURE — 36415 COLL VENOUS BLD VENIPUNCTURE: CPT

## 2023-10-23 PROCEDURE — 85610 PROTHROMBIN TIME: CPT

## 2023-10-30 ENCOUNTER — LAB VISIT (OUTPATIENT)
Dept: LAB | Facility: HOSPITAL | Age: 68
End: 2023-10-30
Attending: FAMILY MEDICINE
Payer: MEDICARE

## 2023-10-30 DIAGNOSIS — I48.91 ATRIAL FIBRILLATION, UNSPECIFIED TYPE: ICD-10-CM

## 2023-10-30 LAB
INR PPP: 1.9
PROTHROMBIN TIME: 18.4 SECONDS (ref 9.1–10.9)

## 2023-10-30 PROCEDURE — 85610 PROTHROMBIN TIME: CPT

## 2023-10-30 PROCEDURE — 36415 COLL VENOUS BLD VENIPUNCTURE: CPT

## 2023-10-30 NOTE — PROGRESS NOTES
Please inform patient of results.     1. Continue current medication.  Repeat INR in 1 week    Other labwork within acceptable ranges.

## 2023-11-06 ENCOUNTER — LAB VISIT (OUTPATIENT)
Dept: LAB | Facility: HOSPITAL | Age: 68
End: 2023-11-06
Attending: FAMILY MEDICINE
Payer: MEDICARE

## 2023-11-06 ENCOUNTER — TELEPHONE (OUTPATIENT)
Dept: FAMILY MEDICINE | Facility: CLINIC | Age: 68
End: 2023-11-06
Payer: MEDICARE

## 2023-11-06 DIAGNOSIS — I48.91 ATRIAL FIBRILLATION, UNSPECIFIED TYPE: ICD-10-CM

## 2023-11-06 LAB
INR PPP: 1.9
PROTHROMBIN TIME: 19.4 SECONDS (ref 9.1–10.9)

## 2023-11-06 PROCEDURE — 36415 COLL VENOUS BLD VENIPUNCTURE: CPT

## 2023-11-06 PROCEDURE — 85610 PROTHROMBIN TIME: CPT

## 2023-11-06 NOTE — TELEPHONE ENCOUNTER
----- Message from Jimmy Mars MD sent at 11/6/2023  3:15 PM CST -----  Please inform patient of lab results, which are all within acceptable ranges.

## 2023-11-24 DIAGNOSIS — K21.9 GASTROESOPHAGEAL REFLUX DISEASE, UNSPECIFIED WHETHER ESOPHAGITIS PRESENT: ICD-10-CM

## 2023-11-27 RX ORDER — PANTOPRAZOLE SODIUM 40 MG/1
TABLET, DELAYED RELEASE ORAL
Qty: 30 TABLET | Refills: 1 | Status: SHIPPED | OUTPATIENT
Start: 2023-11-27 | End: 2024-01-23

## 2023-12-08 ENCOUNTER — LAB VISIT (OUTPATIENT)
Dept: LAB | Facility: HOSPITAL | Age: 68
End: 2023-12-08
Attending: FAMILY MEDICINE
Payer: MEDICARE

## 2023-12-08 DIAGNOSIS — I48.91 ATRIAL FIBRILLATION, UNSPECIFIED TYPE: ICD-10-CM

## 2023-12-08 LAB
INR PPP: 2.4
PROTHROMBIN TIME: 23.9 SECONDS (ref 9.1–10.9)

## 2023-12-08 PROCEDURE — 36415 COLL VENOUS BLD VENIPUNCTURE: CPT

## 2023-12-08 PROCEDURE — 85610 PROTHROMBIN TIME: CPT

## 2023-12-11 ENCOUNTER — TELEPHONE (OUTPATIENT)
Dept: FAMILY MEDICINE | Facility: CLINIC | Age: 68
End: 2023-12-11
Payer: MEDICARE

## 2023-12-11 NOTE — TELEPHONE ENCOUNTER
----- Message from Jimmy Mars MD sent at 12/11/2023  9:36 AM CST -----  Please inform patient of INR results, which are all within acceptable ranges.

## 2024-01-04 ENCOUNTER — TELEPHONE (OUTPATIENT)
Dept: FAMILY MEDICINE | Facility: CLINIC | Age: 69
End: 2024-01-04

## 2024-01-04 ENCOUNTER — OFFICE VISIT (OUTPATIENT)
Dept: FAMILY MEDICINE | Facility: CLINIC | Age: 69
End: 2024-01-04
Payer: MEDICARE

## 2024-01-04 ENCOUNTER — LAB VISIT (OUTPATIENT)
Dept: LAB | Facility: HOSPITAL | Age: 69
End: 2024-01-04
Attending: FAMILY MEDICINE
Payer: MEDICARE

## 2024-01-04 VITALS
BODY MASS INDEX: 27.11 KG/M2 | TEMPERATURE: 97 F | OXYGEN SATURATION: 97 % | WEIGHT: 183 LBS | RESPIRATION RATE: 18 BRPM | HEART RATE: 72 BPM | DIASTOLIC BLOOD PRESSURE: 80 MMHG | SYSTOLIC BLOOD PRESSURE: 114 MMHG | HEIGHT: 69 IN

## 2024-01-04 DIAGNOSIS — I50.22 CHRONIC SYSTOLIC (CONGESTIVE) HEART FAILURE: ICD-10-CM

## 2024-01-04 DIAGNOSIS — I73.9 PERIPHERAL VASCULAR DISEASE, UNSPECIFIED: ICD-10-CM

## 2024-01-04 DIAGNOSIS — I42.0 DILATED CARDIOMYOPATHY: ICD-10-CM

## 2024-01-04 DIAGNOSIS — M10.9 GOUT, UNSPECIFIED CAUSE, UNSPECIFIED CHRONICITY, UNSPECIFIED SITE: Primary | ICD-10-CM

## 2024-01-04 DIAGNOSIS — M10.9 GOUT, UNSPECIFIED CAUSE, UNSPECIFIED CHRONICITY, UNSPECIFIED SITE: ICD-10-CM

## 2024-01-04 DIAGNOSIS — I48.91 ATRIAL FIBRILLATION, UNSPECIFIED TYPE: ICD-10-CM

## 2024-01-04 LAB — URATE SERPL-MCNC: 10.4 MG/DL (ref 3.5–7.2)

## 2024-01-04 PROCEDURE — 99214 OFFICE O/P EST MOD 30 MIN: CPT | Mod: ,,, | Performed by: FAMILY MEDICINE

## 2024-01-04 PROCEDURE — 3008F BODY MASS INDEX DOCD: CPT | Mod: CPTII,,, | Performed by: FAMILY MEDICINE

## 2024-01-04 PROCEDURE — 3074F SYST BP LT 130 MM HG: CPT | Mod: CPTII,,, | Performed by: FAMILY MEDICINE

## 2024-01-04 PROCEDURE — 1101F PT FALLS ASSESS-DOCD LE1/YR: CPT | Mod: CPTII,,, | Performed by: FAMILY MEDICINE

## 2024-01-04 PROCEDURE — 3079F DIAST BP 80-89 MM HG: CPT | Mod: CPTII,,, | Performed by: FAMILY MEDICINE

## 2024-01-04 PROCEDURE — 1125F AMNT PAIN NOTED PAIN PRSNT: CPT | Mod: CPTII,,, | Performed by: FAMILY MEDICINE

## 2024-01-04 PROCEDURE — 1159F MED LIST DOCD IN RCRD: CPT | Mod: CPTII,,, | Performed by: FAMILY MEDICINE

## 2024-01-04 PROCEDURE — 1160F RVW MEDS BY RX/DR IN RCRD: CPT | Mod: CPTII,,, | Performed by: FAMILY MEDICINE

## 2024-01-04 PROCEDURE — 3288F FALL RISK ASSESSMENT DOCD: CPT | Mod: CPTII,,, | Performed by: FAMILY MEDICINE

## 2024-01-04 PROCEDURE — 84550 ASSAY OF BLOOD/URIC ACID: CPT

## 2024-01-04 PROCEDURE — 36415 COLL VENOUS BLD VENIPUNCTURE: CPT

## 2024-01-04 RX ORDER — COLCHICINE 0.6 MG/1
TABLET ORAL
Qty: 10 TABLET | Refills: 0 | Status: SHIPPED | OUTPATIENT
Start: 2024-01-04

## 2024-01-04 RX ORDER — ALLOPURINOL 100 MG/1
100 TABLET ORAL DAILY
Qty: 30 TABLET | Refills: 3 | Status: SHIPPED | OUTPATIENT
Start: 2024-01-04 | End: 2024-02-14 | Stop reason: SDUPTHER

## 2024-01-04 NOTE — PROGRESS NOTES
Please inform patient of results.     1. Continue colchicine p.r.n..  Start allopurinol 100 mg q.day.  Rx sent to pharmacy

## 2024-01-04 NOTE — PROGRESS NOTES
"Subjective:       Patient ID: Carl Mccollum Sr is a 68 y.o. male.    Chief Complaint: Bilateral foot pain, possible Gout,       Bilateral foot pain        Review of Systems   Constitutional: Negative.    Respiratory: Negative.     Cardiovascular: Negative.    Musculoskeletal:         Bilateral foot pain: recent flare, swelling, R>L           Objective:      /80 (BP Location: Left arm, Patient Position: Sitting, BP Method: Large (Manual))   Pulse 72   Temp 96.5 °F (35.8 °C)   Resp 18   Ht 5' 9" (1.753 m)   Wt 83 kg (183 lb)   SpO2 97%   BMI 27.02 kg/m²    Physical Exam  Constitutional:       Appearance: Normal appearance.   Cardiovascular:      Rate and Rhythm: Normal rate and regular rhythm.      Heart sounds: Normal heart sounds.   Pulmonary:      Effort: Pulmonary effort is normal.      Breath sounds: Normal breath sounds.   Musculoskeletal:      Comments: Mild edema noted on right foot, +1 DP/PT pulse   Neurological:      Mental Status: He is alert.   Psychiatric:         Mood and Affect: Mood normal.         Behavior: Behavior normal.         Thought Content: Thought content normal.         Judgment: Judgment normal.               Assessment:       Problem List Items Addressed This Visit          Cardiac/Vascular    Atrial fibrillation    Current Assessment & Plan     Followed by Dr. Alberto   Continue current medication         Dilated cardiomyopathy    Current Assessment & Plan     Followed by Dr. Alberto  Continue current medication   Keep scheduled appointment         Peripheral vascular disease, unspecified    Current Assessment & Plan     Followed by Dr. Alberto         Chronic systolic (congestive) heart failure    Current Assessment & Plan     Followed by Dr. Alberto   Continue current medication          Other Visit Diagnoses       Gout, unspecified cause, unspecified chronicity, unspecified site    -  Primary    Relevant Medications    colchicine (COLCRYS) 0.6 mg tablet    Other Relevant Orders    Uric " Acid (Completed)               Plan:   1. Gout, unspecified cause, unspecified chronicity, unspecified site  -     Uric Acid; Future; Expected date: 01/04/2024  -     colchicine (COLCRYS) 0.6 mg tablet; Take one tablet by mouth, may repeat in one hour. May not repeat again for another 24 hours  Dispense: 10 tablet; Refill: 0  Check uric acid level   Rx for colchicine p.r.n.  Discussed starting allopurinol pending lab work results  Monitor   Return to clinic with any concerns     2. Chronic systolic (congestive) heart failure  Assessment & Plan:  Followed by Dr. Alberto   Continue current medication    3. Atrial fibrillation, unspecified type  Assessment & Plan:  Followed by Dr. Alberto   Continue current medication    4. Dilated cardiomyopathy  Assessment & Plan:  Followed by Dr. Alberto  Continue current medication   Keep scheduled appointment    5. Peripheral vascular disease, unspecified  Assessment & Plan:  Followed by Dr. Alberto

## 2024-01-04 NOTE — TELEPHONE ENCOUNTER
----- Message from Jimmy Mars MD sent at 1/4/2024  4:56 PM CST -----  Please inform patient of results.     1. Continue colchicine p.r.n..  Start allopurinol 100 mg q.day.  Rx sent to pharmacy

## 2024-01-09 ENCOUNTER — LAB VISIT (OUTPATIENT)
Dept: LAB | Facility: HOSPITAL | Age: 69
End: 2024-01-09
Attending: FAMILY MEDICINE
Payer: MEDICARE

## 2024-01-09 ENCOUNTER — TELEPHONE (OUTPATIENT)
Dept: FAMILY MEDICINE | Facility: CLINIC | Age: 69
End: 2024-01-09
Payer: MEDICARE

## 2024-01-09 DIAGNOSIS — I48.91 ATRIAL FIBRILLATION, UNSPECIFIED TYPE: ICD-10-CM

## 2024-01-09 LAB
INR PPP: 2.8
PROTHROMBIN TIME: 27.8 SECONDS (ref 9.1–10.9)

## 2024-01-09 PROCEDURE — 85610 PROTHROMBIN TIME: CPT

## 2024-01-09 PROCEDURE — 36415 COLL VENOUS BLD VENIPUNCTURE: CPT

## 2024-01-09 NOTE — TELEPHONE ENCOUNTER
----- Message from Jimmy Mars MD sent at 1/9/2024 11:41 AM CST -----  Please inform patient of results.     1. INR in acceptable range

## 2024-01-15 DIAGNOSIS — I48.91 ATRIAL FIBRILLATION, UNSPECIFIED TYPE: ICD-10-CM

## 2024-01-16 RX ORDER — WARFARIN 4 MG/1
TABLET ORAL
Qty: 30 TABLET | Refills: 2 | Status: SHIPPED | OUTPATIENT
Start: 2024-01-16 | End: 2024-04-24

## 2024-01-23 DIAGNOSIS — K21.9 GASTROESOPHAGEAL REFLUX DISEASE, UNSPECIFIED WHETHER ESOPHAGITIS PRESENT: ICD-10-CM

## 2024-01-23 RX ORDER — PANTOPRAZOLE SODIUM 40 MG/1
TABLET, DELAYED RELEASE ORAL
Qty: 30 TABLET | Refills: 1 | Status: SHIPPED | OUTPATIENT
Start: 2024-01-23 | End: 2024-04-22

## 2024-02-08 ENCOUNTER — LAB VISIT (OUTPATIENT)
Dept: LAB | Facility: HOSPITAL | Age: 69
End: 2024-02-08
Attending: FAMILY MEDICINE
Payer: MEDICARE

## 2024-02-08 ENCOUNTER — OFFICE VISIT (OUTPATIENT)
Dept: FAMILY MEDICINE | Facility: CLINIC | Age: 69
End: 2024-02-08
Payer: MEDICARE

## 2024-02-08 VITALS
TEMPERATURE: 97 F | HEART RATE: 65 BPM | SYSTOLIC BLOOD PRESSURE: 126 MMHG | OXYGEN SATURATION: 96 % | BODY MASS INDEX: 26.81 KG/M2 | DIASTOLIC BLOOD PRESSURE: 78 MMHG | WEIGHT: 181 LBS | HEIGHT: 69 IN | RESPIRATION RATE: 18 BRPM

## 2024-02-08 DIAGNOSIS — I48.91 ATRIAL FIBRILLATION, UNSPECIFIED TYPE: ICD-10-CM

## 2024-02-08 DIAGNOSIS — I10 HYPERTENSION, UNSPECIFIED TYPE: ICD-10-CM

## 2024-02-08 DIAGNOSIS — M10.9 GOUT, UNSPECIFIED CAUSE, UNSPECIFIED CHRONICITY, UNSPECIFIED SITE: Primary | ICD-10-CM

## 2024-02-08 LAB
INR PPP: 3.4
PROTHROMBIN TIME: 33.7 SECONDS (ref 9.1–10.9)

## 2024-02-08 PROCEDURE — 36415 COLL VENOUS BLD VENIPUNCTURE: CPT

## 2024-02-08 PROCEDURE — 1101F PT FALLS ASSESS-DOCD LE1/YR: CPT | Mod: CPTII,,, | Performed by: FAMILY MEDICINE

## 2024-02-08 PROCEDURE — 1159F MED LIST DOCD IN RCRD: CPT | Mod: CPTII,,, | Performed by: FAMILY MEDICINE

## 2024-02-08 PROCEDURE — 99214 OFFICE O/P EST MOD 30 MIN: CPT | Mod: ,,, | Performed by: FAMILY MEDICINE

## 2024-02-08 PROCEDURE — 3074F SYST BP LT 130 MM HG: CPT | Mod: CPTII,,, | Performed by: FAMILY MEDICINE

## 2024-02-08 PROCEDURE — 85610 PROTHROMBIN TIME: CPT

## 2024-02-08 PROCEDURE — 4010F ACE/ARB THERAPY RXD/TAKEN: CPT | Mod: CPTII,,, | Performed by: FAMILY MEDICINE

## 2024-02-08 PROCEDURE — 3008F BODY MASS INDEX DOCD: CPT | Mod: CPTII,,, | Performed by: FAMILY MEDICINE

## 2024-02-08 PROCEDURE — 3078F DIAST BP <80 MM HG: CPT | Mod: CPTII,,, | Performed by: FAMILY MEDICINE

## 2024-02-08 PROCEDURE — 1160F RVW MEDS BY RX/DR IN RCRD: CPT | Mod: CPTII,,, | Performed by: FAMILY MEDICINE

## 2024-02-08 PROCEDURE — 3288F FALL RISK ASSESSMENT DOCD: CPT | Mod: CPTII,,, | Performed by: FAMILY MEDICINE

## 2024-02-08 NOTE — PROGRESS NOTES
"Subjective:       Patient ID: Carl Mccollum Sr is a 68 y.o. male.    Chief Complaint: 6 month  and Gout      Routine      Hypertension  - tolerating medication (no side effects)  - no headaches  - patient without any complaints    Review of Systems   Constitutional: Negative.    Respiratory: Negative.     Cardiovascular: Negative.         Afib: patient without any complaints, recent lab work   Gastrointestinal: Negative.    Musculoskeletal:         Gout: improved, occ pain in left great toe   Psychiatric/Behavioral: Negative.             Objective:      /78 (BP Location: Left arm, Patient Position: Sitting, BP Method: Large (Manual))   Pulse 65   Temp 97.1 °F (36.2 °C)   Resp 18   Ht 5' 9" (1.753 m)   Wt 82.1 kg (181 lb)   SpO2 96%   BMI 26.73 kg/m²    Physical Exam  Constitutional:       Appearance: Normal appearance.   Cardiovascular:      Rate and Rhythm: Normal rate and regular rhythm.      Heart sounds: Normal heart sounds.   Pulmonary:      Effort: Pulmonary effort is normal.      Breath sounds: Normal breath sounds.   Neurological:      Mental Status: He is alert.   Psychiatric:         Mood and Affect: Mood normal.         Behavior: Behavior normal.         Thought Content: Thought content normal.         Judgment: Judgment normal.             Recent Results (from the past 504 hour(s))   Protime-INR    Collection Time: 02/08/24  7:33 AM   Result Value Ref Range    PT 33.7 (H) 9.1 - 10.9 seconds    INR 3.4 (H) <=1.3        Assessment:       Problem List Items Addressed This Visit          Cardiac/Vascular    Atrial fibrillation    Hypertension     Other Visit Diagnoses       Gout, unspecified cause, unspecified chronicity, unspecified site    -  Primary    Relevant Orders    Uric Acid               Plan:   1. Gout, unspecified cause, unspecified chronicity, unspecified site  -     Uric Acid; Future; Expected date: 02/08/2024  Continue current medication  Check uric acid level   Return to clinic " with any concerns     2. Hypertension, unspecified type  Controlled  Continue current Rx medication  Return to clinic with any concerns    3. Atrial fibrillation, unspecified type  Controlled  Continue current Rx medication  Lab work discussed with patient  Return to clinic with any concerns

## 2024-02-09 ENCOUNTER — LAB VISIT (OUTPATIENT)
Dept: LAB | Facility: HOSPITAL | Age: 69
End: 2024-02-09
Attending: FAMILY MEDICINE
Payer: MEDICARE

## 2024-02-09 DIAGNOSIS — M10.9 GOUT, UNSPECIFIED CAUSE, UNSPECIFIED CHRONICITY, UNSPECIFIED SITE: ICD-10-CM

## 2024-02-09 LAB — URATE SERPL-MCNC: 8.6 MG/DL (ref 3.5–7.2)

## 2024-02-09 PROCEDURE — 36415 COLL VENOUS BLD VENIPUNCTURE: CPT

## 2024-02-09 PROCEDURE — 84550 ASSAY OF BLOOD/URIC ACID: CPT

## 2024-02-14 ENCOUNTER — TELEPHONE (OUTPATIENT)
Dept: FAMILY MEDICINE | Facility: CLINIC | Age: 69
End: 2024-02-14
Payer: MEDICARE

## 2024-02-14 RX ORDER — ALLOPURINOL 100 MG/1
TABLET ORAL
Qty: 60 TABLET | Refills: 3 | Status: SHIPPED | OUTPATIENT
Start: 2024-02-14 | End: 2024-05-15

## 2024-02-14 NOTE — TELEPHONE ENCOUNTER
----- Message from Jimmy Mars MD sent at 2/14/2024  1:17 PM CST -----  Please inform patient of results.     1. Uric acid level improving.  Increase allopurinol to 100 mg 2 tabs q.day.  Rx sent to pharmacy

## 2024-02-14 NOTE — PROGRESS NOTES
Please inform patient of results.     1. Uric acid level improving.  Increase allopurinol to 100 mg 2 tabs q.day.  Rx sent to pharmacy

## 2024-02-15 ENCOUNTER — TELEPHONE (OUTPATIENT)
Dept: FAMILY MEDICINE | Facility: CLINIC | Age: 69
End: 2024-02-15
Payer: MEDICARE

## 2024-02-15 ENCOUNTER — APPOINTMENT (OUTPATIENT)
Dept: LAB | Facility: HOSPITAL | Age: 69
End: 2024-02-15
Attending: FAMILY MEDICINE
Payer: MEDICARE

## 2024-02-15 NOTE — TELEPHONE ENCOUNTER
----- Message from Jimmy Mars MD sent at 2/15/2024 12:39 PM CST -----  Please inform patient of lab results, which are all within acceptable ranges.

## 2024-03-11 ENCOUNTER — TELEPHONE (OUTPATIENT)
Dept: FAMILY MEDICINE | Facility: CLINIC | Age: 69
End: 2024-03-11
Payer: MEDICARE

## 2024-03-11 ENCOUNTER — LAB VISIT (OUTPATIENT)
Dept: LAB | Facility: HOSPITAL | Age: 69
End: 2024-03-11
Attending: FAMILY MEDICINE
Payer: MEDICARE

## 2024-03-11 DIAGNOSIS — I48.91 ATRIAL FIBRILLATION, UNSPECIFIED TYPE: ICD-10-CM

## 2024-03-11 DIAGNOSIS — I48.91 ATRIAL FIBRILLATION, UNSPECIFIED TYPE: Primary | ICD-10-CM

## 2024-03-11 LAB
INR PPP: 2
PROTHROMBIN TIME: 20.5 SECONDS (ref 9.1–10.9)

## 2024-03-11 PROCEDURE — 36415 COLL VENOUS BLD VENIPUNCTURE: CPT

## 2024-03-11 PROCEDURE — 85610 PROTHROMBIN TIME: CPT

## 2024-03-11 NOTE — TELEPHONE ENCOUNTER
----- Message from Jimmy Mars MD sent at 3/11/2024  1:01 PM CDT -----  Please inform patient of lab results, which are all within acceptable ranges.

## 2024-04-03 ENCOUNTER — PATIENT MESSAGE (OUTPATIENT)
Dept: ADMINISTRATIVE | Facility: OTHER | Age: 69
End: 2024-04-03
Payer: MEDICARE

## 2024-04-08 ENCOUNTER — LAB VISIT (OUTPATIENT)
Dept: LAB | Facility: HOSPITAL | Age: 69
End: 2024-04-08
Attending: FAMILY MEDICINE
Payer: MEDICARE

## 2024-04-08 ENCOUNTER — TELEPHONE (OUTPATIENT)
Dept: FAMILY MEDICINE | Facility: CLINIC | Age: 69
End: 2024-04-08
Payer: MEDICARE

## 2024-04-08 DIAGNOSIS — I48.91 ATRIAL FIBRILLATION, UNSPECIFIED TYPE: ICD-10-CM

## 2024-04-08 LAB
INR PPP: 2.5
PROTHROMBIN TIME: 25.3 SECONDS (ref 9.1–10.9)

## 2024-04-08 PROCEDURE — 36415 COLL VENOUS BLD VENIPUNCTURE: CPT

## 2024-04-08 PROCEDURE — 85610 PROTHROMBIN TIME: CPT

## 2024-04-08 NOTE — TELEPHONE ENCOUNTER
----- Message from Jimmy Mars MD sent at 4/8/2024 11:17 AM CDT -----  Please inform patient of lab results, which are all within acceptable ranges.

## 2024-04-22 DIAGNOSIS — K21.9 GASTROESOPHAGEAL REFLUX DISEASE, UNSPECIFIED WHETHER ESOPHAGITIS PRESENT: ICD-10-CM

## 2024-04-22 RX ORDER — PANTOPRAZOLE SODIUM 40 MG/1
TABLET, DELAYED RELEASE ORAL
Qty: 30 TABLET | Refills: 1 | Status: SHIPPED | OUTPATIENT
Start: 2024-04-22

## 2024-04-24 DIAGNOSIS — I48.91 ATRIAL FIBRILLATION, UNSPECIFIED TYPE: ICD-10-CM

## 2024-04-24 RX ORDER — WARFARIN 4 MG/1
TABLET ORAL
Qty: 30 TABLET | Refills: 2 | Status: SHIPPED | OUTPATIENT
Start: 2024-04-24

## 2024-05-03 ENCOUNTER — HOSPITAL ENCOUNTER (EMERGENCY)
Facility: HOSPITAL | Age: 69
Discharge: HOME | End: 2024-05-04
Attending: FAMILY MEDICINE
Payer: MEDICARE

## 2024-05-03 DIAGNOSIS — R07.9 CHEST PAIN: Primary | ICD-10-CM

## 2024-05-03 LAB
ALBUMIN SERPL-MCNC: 3.5 G/DL (ref 3.4–4.8)
ALBUMIN/GLOB SERPL: 0.9 RATIO (ref 1.1–2)
ALP SERPL-CCNC: 101 UNIT/L (ref 40–150)
ALT SERPL-CCNC: 10 UNIT/L (ref 0–55)
APTT PPP: 30.1 SECONDS (ref 21.4–28.3)
AST SERPL-CCNC: 18 UNIT/L (ref 5–34)
BASOPHILS # BLD AUTO: 0.11 X10(3)/MCL
BASOPHILS NFR BLD AUTO: 1 %
BILIRUB SERPL-MCNC: 0.3 MG/DL
BNP BLD-MCNC: 46.8 PG/ML
BUN SERPL-MCNC: 21 MG/DL (ref 8.4–25.7)
CALCIUM SERPL-MCNC: 9.3 MG/DL (ref 8.8–10)
CHLORIDE SERPL-SCNC: 105 MMOL/L (ref 98–107)
CK MB SERPL-MCNC: 1.7 NG/ML
CK SERPL-CCNC: 176 U/L (ref 30–200)
CO2 SERPL-SCNC: 24 MMOL/L (ref 23–31)
CREAT SERPL-MCNC: 1.43 MG/DL (ref 0.73–1.18)
EOSINOPHIL # BLD AUTO: 0.4 X10(3)/MCL (ref 0–0.9)
EOSINOPHIL NFR BLD AUTO: 3.7 %
ERYTHROCYTE [DISTWIDTH] IN BLOOD BY AUTOMATED COUNT: 15.5 % (ref 11.5–17)
GFR SERPLBLD CREATININE-BSD FMLA CKD-EPI: 53 MLS/MIN/1.73/M2
GLOBULIN SER-MCNC: 4.1 GM/DL (ref 2.4–3.5)
GLUCOSE SERPL-MCNC: 103 MG/DL (ref 82–115)
HCT VFR BLD AUTO: 44.6 % (ref 42–52)
HGB BLD-MCNC: 15.3 G/DL (ref 14–18)
IMM GRANULOCYTES # BLD AUTO: 0.03 X10(3)/MCL (ref 0–0.04)
IMM GRANULOCYTES NFR BLD AUTO: 0.3 %
INR PPP: 1.7
LYMPHOCYTES # BLD AUTO: 4.35 X10(3)/MCL (ref 0.6–4.6)
LYMPHOCYTES NFR BLD AUTO: 39.7 %
MCH RBC QN AUTO: 30.8 PG (ref 27–31)
MCHC RBC AUTO-ENTMCNC: 34.3 G/DL (ref 33–36)
MCV RBC AUTO: 89.7 FL (ref 80–94)
MONOCYTES # BLD AUTO: 0.92 X10(3)/MCL (ref 0.1–1.3)
MONOCYTES NFR BLD AUTO: 8.4 %
NEUTROPHILS # BLD AUTO: 5.14 X10(3)/MCL (ref 2.1–9.2)
NEUTROPHILS NFR BLD AUTO: 46.9 %
NRBC BLD AUTO-RTO: 0 %
PLATELET # BLD AUTO: 226 X10(3)/MCL (ref 130–400)
PMV BLD AUTO: 9.6 FL (ref 7.4–10.4)
POTASSIUM SERPL-SCNC: 4 MMOL/L (ref 3.5–5.1)
PROT SERPL-MCNC: 7.6 GM/DL (ref 5.8–7.6)
PROTHROMBIN TIME: 17.4 SECONDS (ref 9.1–10.9)
RBC # BLD AUTO: 4.97 X10(6)/MCL (ref 4.7–6.1)
SODIUM SERPL-SCNC: 137 MMOL/L (ref 136–145)
TROPONIN I SERPL-MCNC: 0.01 NG/ML (ref 0–0.04)
TROPONIN I SERPL-MCNC: <0.01 NG/ML (ref 0–0.04)
WBC # SPEC AUTO: 10.95 X10(3)/MCL (ref 4.5–11.5)

## 2024-05-03 PROCEDURE — 82550 ASSAY OF CK (CPK): CPT | Performed by: FAMILY MEDICINE

## 2024-05-03 PROCEDURE — 85730 THROMBOPLASTIN TIME PARTIAL: CPT | Performed by: FAMILY MEDICINE

## 2024-05-03 PROCEDURE — 83880 ASSAY OF NATRIURETIC PEPTIDE: CPT | Performed by: FAMILY MEDICINE

## 2024-05-03 PROCEDURE — 99284 EMERGENCY DEPT VISIT MOD MDM: CPT | Mod: 25

## 2024-05-03 PROCEDURE — 80053 COMPREHEN METABOLIC PANEL: CPT | Performed by: FAMILY MEDICINE

## 2024-05-03 PROCEDURE — 84484 ASSAY OF TROPONIN QUANT: CPT | Mod: 91 | Performed by: FAMILY MEDICINE

## 2024-05-03 PROCEDURE — 85025 COMPLETE CBC W/AUTO DIFF WBC: CPT | Performed by: FAMILY MEDICINE

## 2024-05-03 PROCEDURE — 85610 PROTHROMBIN TIME: CPT | Performed by: FAMILY MEDICINE

## 2024-05-03 PROCEDURE — 82553 CREATINE MB FRACTION: CPT | Performed by: FAMILY MEDICINE

## 2024-05-03 PROCEDURE — 93005 ELECTROCARDIOGRAM TRACING: CPT

## 2024-05-04 VITALS
HEIGHT: 70 IN | OXYGEN SATURATION: 98 % | RESPIRATION RATE: 21 BRPM | WEIGHT: 185 LBS | SYSTOLIC BLOOD PRESSURE: 156 MMHG | HEART RATE: 65 BPM | BODY MASS INDEX: 26.48 KG/M2 | DIASTOLIC BLOOD PRESSURE: 91 MMHG | TEMPERATURE: 98 F

## 2024-05-04 NOTE — CONSULTS
[facility] - Emergency Dept  Cardiology  Consult Note    Patient Name: Carl Mccollum Sr  MRN: [id number]  Admission Date: 5/3/2024  Hospital Length of Stay: 0 days  Code Status: No Order   Attending Provider: Carl Pace MD   Consulting Provider: Denny Cox NP  Primary Care Physician: Jimmy Mars MD  Principal Problem:<principal problem not specified>    Patient information was obtained from patient, past medical records, ER records, and primary team.     Consults  Subjective:     Chief Complaint:    Telecardiology Consult  Location: [facility]  Reason: CP  Provider: Dr Pace  Duration: > 30 minutes including review of past and current medical recpords, provider and patient interview    HPI:   68-year-old male followed by Dr. Douglas Alberto.  He was last seen March 20, 2024.  He has a history of coronary artery disease and had a drug-eluting stent to his distal RCA in January of 2020.  His last PET scan was in January of 2021 and showed no ischemia.  He also has paroxysmal atrial fibrillation and currently is on Coumadin for anticoagulation and rate controlled with sotalol 80 b.i.d..  He is followed by Dr. Sanket German for this.  AICD is also followed by Dr. German.  He was sitting on the sofa watching TV develop some substernal chest discomfort described as a pressure-type sensation that lasted 5 or 6 minutes then he broke out in a cold sweat.  He presented to the ER for an evaluation.  EKG done upon arrival showed normal sinus rhythm with no acute changes.  Cardiac enzymes have been negative x2.  He has had no further episodes of chest discomfort since his arrival.  He states prior to today he is able to do activities of daily living and exerting himself without chest pain or shortness of breath.  Prior to his stenting in 2020 he had an abnormal stress without discomfort.    Past Medical History:   Diagnosis Date    AAA (abdominal aortic aneurysm)     Hypertension     Paroxysmal atrial  fibrillation     Personal history of colonic polyps 09/30/2021       Past Surgical History:   Procedure Laterality Date    ABDOMINAL AORTIC ANEURYSM REPAIR      COLONOSCOPY  09/30/2021    Dr. Tony    CORONARY ANGIOPLASTY WITH STENT PLACEMENT      INSERTION OF BIVENTRICULAR IMPLANTABLE CARDIOVERTER-DEFIBRILLATOR (ICD)         Review of patient's allergies indicates:  No Known Allergies    No current facility-administered medications for this encounter.     Current Outpatient Medications   Medication Sig Dispense Refill    allopurinoL (ZYLOPRIM) 100 MG tablet Take 2 tab daily 60 tablet 3    amLODIPine (NORVASC) 5 MG tablet Take 5 mg by mouth.      co-enzyme Q-10 50 mg capsule Take 50 mg by mouth once daily.      colchicine (COLCRYS) 0.6 mg tablet Take one tablet by mouth, may repeat in one hour. May not repeat again for another 24 hours 10 tablet 0    hydroCHLOROthiazide (HYDRODIURIL) 25 MG tablet       nitroGLYCERIN (NITROSTAT) 0.4 MG SL tablet Place 0.4 mg under the tongue every 5 (five) minutes as needed for Chest pain.      olmesartan (BENICAR) 40 MG tablet       pantoprazole (PROTONIX) 40 MG tablet TAKE ONE TABLET BY MOUTH DAILY FOR STOMACH 30 tablet 1    rosuvastatin (CRESTOR) 40 MG Tab Take 40 mg by mouth.      sotaloL (BETAPACE) 80 MG tablet       warfarin (COUMADIN) 4 MG tablet TAKE ONE TABLET BY MOUTH DAILY 30 tablet 2     Family History       Problem Relation (Age of Onset)    Heart disease Mother, Father    Kidney disease Mother          Tobacco Use    Smoking status: Every Day     Current packs/day: 0.50     Types: Cigarettes    Smokeless tobacco: Never   Substance and Sexual Activity    Alcohol use: Yes    Drug use: Not Currently    Sexual activity: Not Currently     Review of Systems   Constitutional: Negative.   HENT: Negative.     Eyes: Negative.    Cardiovascular:  Positive for chest pain. Negative for dyspnea on exertion and near-syncope.   Respiratory: Negative.     Endocrine: Negative.     Musculoskeletal: Negative.    Gastrointestinal: Negative.    Genitourinary: Negative.    Psychiatric/Behavioral: Negative.       Objective:     Vital Signs (Most Recent):  Temp: 97.5 °F (36.4 °C) (05/03/24 2152)  Pulse: 62 (05/03/24 2238)  Resp: (!) 22 (05/03/24 2238)  BP: (!) 140/87 (05/03/24 2238)  SpO2: (!) 93 % (05/03/24 2238) Vital Signs (24h Range):  Temp:  [97.5 °F (36.4 °C)] 97.5 °F (36.4 °C)  Pulse:  [61-62] 62  Resp:  [18-22] 22  SpO2:  [93 %-98 %] 93 %  BP: (140-162)/(87-91) 140/87     Weight: 83.9 kg (185 lb)  Body mass index is 26.54 kg/m².    SpO2: (!) 93 %       No intake or output data in the 24 hours ending 05/04/24 0019    Lines/Drains/Airways       Peripheral Intravenous Line  Duration                  Peripheral IV - Single Lumen 05/03/24 2202 18 G;1 1/4 in Anterior;Right Forearm <1 day                    Physical Exam  Constitutional:       Appearance: Normal appearance. He is normal weight.   HENT:      Head: Normocephalic.      Nose: Nose normal.      Mouth/Throat:      Mouth: Mucous membranes are moist.   Eyes:      Extraocular Movements: Extraocular movements intact.   Cardiovascular:      Rate and Rhythm: Normal rate and regular rhythm.   Pulmonary:      Effort: Pulmonary effort is normal.      Breath sounds: Normal breath sounds.   Abdominal:      General: Bowel sounds are normal.   Musculoskeletal:         General: Normal range of motion.   Skin:     General: Skin is warm and dry.   Neurological:      General: No focal deficit present.      Mental Status: He is alert.   Psychiatric:         Mood and Affect: Mood normal.         Significant Labs:   Recent Lab Results         05/03/24 2323 05/03/24 2207        Albumin/Globulin Ratio   0.9       Albumin   3.5       ALP   101       ALT   10       PTT   30.1  Comment: For Minimal Heparin Infusion, the goal aPTT 64-85 seconds corresponds to an anti-Xa of 0.3-0.5.    For Low Intensity and High Intensity Heparin, the goal aPTT   seconds corresponds to an anti-Xa of 0.3-0.7       AST   18       Baso #   0.11       Basophil %   1.0       BILIRUBIN TOTAL   0.3       BNP   46.8       BUN   21.0       Calcium   9.3       Chloride   105       CO2   24       CPK   176       CPK MB   1.7       Creatinine   1.43       eGFR   53       Eos #   0.40       Eos %   3.7       Globulin, Total   4.1       Glucose   103       Hematocrit   44.6       Hemoglobin   15.3       Immature Grans (Abs)   0.03       Immature Granulocytes   0.3       INR   1.7       Lymph #   4.35       LYMPH %   39.7       MCH   30.8       MCHC   34.3       MCV   89.7       Mono #   0.92       Mono %   8.4       MPV   9.6       Neut #   5.14       Neut %   46.9       nRBC   0.0       Platelet Count   226       Potassium   4.0       PROTEIN TOTAL   7.6       PT   17.4       RBC   4.97       RDW   15.5       Sodium   137       Troponin I 0.010   <0.010       WBC   10.95               Significant Imaging:   EKG:      Imaging:  CXR: no acute process    Impression:  CP  - Negative EKG and CE's ( x 2 )  CAD  - CATHLEEN distal RCA in 1/2020  - Negative PET 1/2021  PAF ( Dr German)  AICD ( Dr German )  - Sotolol and Coumadin  HTN  - LDL: 59 5/2022  AAA  Smoker    Plan:  Currently without chest discomfort   EKG and cardiac biomarkers x2 are negative  He can be discharged we will get him scheduled for an outpatient PET scan, echocardiogram and follow-up visit.    If he has any further discomfort he will need to be transferred to a PCI capable facility    I also instructed him if he has anymore episodes as an outpatient prior to his stress test he should present to the ER for evaluation and possible diagnostic left heart catheterization.    He continue his current medications.  Tonight his INR is subtherapeutic at 1.7 I would like him to take a total of 6 mg of Coumadin tonight then get back on his 4 mg daily.    He will also need a prescription for sublingual nitroglycerin 0.4 mg sublingually  p.r.n.    Thank you again for the consultation should you have any further questions or concerns please do not hesitate to call        There are no hospital problems to display for this patient.      VTE Risk Mitigation (From admission, onward)      None            Thank you for your consult.     Denny Cox NP  Cardiology   [facility] - Emergency Dept

## 2024-05-04 NOTE — ED PROVIDER NOTES
Encounter Date: 5/3/2024       History     Chief Complaint   Patient presents with    Chest Pain     C/o mid sternal CP starting 15 min pta. Pt states he did get a little diaphoretic at that time. Has extensive cardiac hx. Has PM/Defibrillator. Denies SOB and NVD. AAOx4.     Patient presents to the emergency room with midsternal chest pain which began about an hour before.  It lasted for about 5 minutes and resolved spontaneously, he did feel little sweaty on having it.  He is currently feeling fine with no problems.  He denies shortness of breath, nausea or radiation of the pain.  He has a history of coronary artery disease, AFib, and an AICD.  He has had some stents placed, most recently in 2020.  His cardiologist is Dr. Alberto with [facility].      The history is provided by the patient.     Review of patient's allergies indicates:  No Known Allergies  Past Medical History:   Diagnosis Date    AAA (abdominal aortic aneurysm)     Hypertension     Paroxysmal atrial fibrillation     Personal history of colonic polyps 09/30/2021     Past Surgical History:   Procedure Laterality Date    ABDOMINAL AORTIC ANEURYSM REPAIR      COLONOSCOPY  09/30/2021    Dr. Tony    CORONARY ANGIOPLASTY WITH STENT PLACEMENT      INSERTION OF BIVENTRICULAR IMPLANTABLE CARDIOVERTER-DEFIBRILLATOR (ICD)       Family History   Problem Relation Name Age of Onset    Kidney disease Mother      Heart disease Mother      Heart disease Father       Social History     Tobacco Use    Smoking status: Every Day     Current packs/day: 0.50     Types: Cigarettes    Smokeless tobacco: Never   Substance Use Topics    Alcohol use: Yes    Drug use: Not Currently     Review of Systems    Physical Exam     Initial Vitals [05/03/24 2152]   BP Pulse Resp Temp SpO2   (!) 162/91 61 18 97.5 °F (36.4 °C) 98 %      MAP       --         Physical Exam    Nursing note and vitals reviewed.  Constitutional: Vital signs are normal. He appears well-developed and well-nourished. He is  cooperative.  Non-toxic appearance. He does not appear ill.   HENT:   Head: Normocephalic and atraumatic.   Eyes: Conjunctivae and lids are normal.   Neck: Trachea normal. Neck supple.   Cardiovascular:  Normal rate and regular rhythm.  No extrasystoles are present.          Pulmonary/Chest: Breath sounds normal.   Abdominal: Abdomen is soft. There is no abdominal tenderness.   Musculoskeletal:         General: Normal range of motion.      Cervical back: Neck supple.     Neurological: He is alert and oriented to person, place, and time. He has normal strength. No cranial nerve deficit or sensory deficit. He displays a negative Romberg sign.   Skin: Skin is warm, dry and intact. Capillary refill takes less than 2 seconds.   Psychiatric: He has a normal mood and affect. His speech is normal and behavior is normal. He is not actively hallucinating. He is attentive.         ED Course   Procedures  Labs Reviewed   COMPREHENSIVE METABOLIC PANEL - Abnormal; Notable for the following components:       Result Value    Creatinine 1.43 (*)     Globulin 4.1 (*)     Albumin/Globulin Ratio 0.9 (*)     All other components within normal limits   PROTIME-INR - Abnormal; Notable for the following components:    PT 17.4 (*)     INR 1.7 (*)     All other components within normal limits   APTT - Abnormal; Notable for the following components:    PTT 30.1 (*)     All other components within normal limits   TROPONIN I - Normal   CK - Normal   CK-MB - Normal   B-TYPE NATRIURETIC PEPTIDE - Normal   TROPONIN I - Normal   CBC W/ AUTO DIFFERENTIAL    Narrative:     The following orders were created for panel order CBC Auto Differential.  Procedure                               Abnormality         Status                     ---------                               -----------         ------                     CBC with Differential[[id number]]                           Final result                 Please view results for these tests on the  individual orders.   CBC WITH DIFFERENTIAL     EKG Readings: (Independently Interpreted)   Initial Reading: No STEMI.   Paced at 60 bpm     ECG Results              EKG 12-lead (In process)        Collection Time Result Time QRS Duration [facility] QTC Calculation    05/03/24 22:00:53 05/03/24 22:08:06 106 426                     In process by Interface, Lab In [facility] (05/03/24 22:08:11)                   Narrative:    Test Reason : R07.9,    Vent. Rate : 060 BPM     Atrial Rate : 060 BPM     P-R Int : 228 ms          QRS Dur : 106 ms      QT Int : 426 ms       P-R-T Axes : 000 053 061 degrees     QTc Int : 426 ms    Atrial-paced rhythm with prolonged AV conduction  Abnormal ECG  When compared with ECG of 03-MAY-2024 21:59,  No significant change was found    Referred By:             Confirmed By:                                   Imaging Results              X-Ray Chest AP Portable (Final result)  Result time 05/03/24 22:17:55      Final result by Demian Ontiveros MD (05/03/24 22:17:55)                   Impression:      No acute cardiopulmonary process identified.      Electronically signed by: Demian Ontiveros  Date:    05/03/2024  Time:    22:17               Narrative:    EXAMINATION:  XR CHEST AP PORTABLE    CLINICAL HISTORY:  Chest pain, unspecified    TECHNIQUE:  One view    COMPARISON:  February 12, 2021.    FINDINGS:  Cardiopericardial silhouette is within normal limits.  Cardiac device electrodes terminate within the right atrium and the right ventricle.  No acute dense focal or segmental consolidation, congestive process, pleural effusions or pneumothorax.                                       Medications - No data to display  Medical Decision Making  CBC, Chem 12 unremarkable, cardiac enzymes negative x2, EKG without significant ischemic changes    Do the patient's pre-existing coronary artery disease we will get a [facility] consult, but he has been chest pain-free since arrival to the emergency room.    Patient  evaluated by [facility] consult.  Recommend outpatient stress test, as his last stress test was around 2021.  Any further chest pain episodes that would recommend he be transferred to a facility that can do PCI.  Otherwise they will arrange for him to be evaluated in clinic and set up for a nuclear stress test.    Amount and/or Complexity of Data Reviewed  Labs: ordered.  Radiology: ordered.                                      Clinical Impression:  Final diagnoses:  [R07.9] Chest pain (Primary)          ED Disposition Condition    Discharge Stable          ED Prescriptions    None       Follow-up Information       Follow up With Specialties Details Why Contact Info    Douglas Alberto MD Cardiology Schedule an appointment as soon as possible for a visit   [address] [facility] [address]  [phone]               Carl Pace MD  05/04/24 0009       Carl Pace MD  05/04/24 0100

## 2024-05-06 ENCOUNTER — PATIENT OUTREACH (OUTPATIENT)
Dept: EMERGENCY MEDICINE | Facility: HOSPITAL | Age: 69
End: 2024-05-06
Payer: MEDICARE

## 2024-05-06 LAB
OHS QRS DURATION: 106 MS
OHS QTC CALCULATION: 426 MS

## 2024-05-06 NOTE — PROGRESS NOTES
Patient stated that he has already reached out to Dr Cole at [facility] and scheduled his follow up. Patient declined assistance at this time.

## 2024-05-10 ENCOUNTER — LAB VISIT (OUTPATIENT)
Dept: LAB | Facility: HOSPITAL | Age: 69
End: 2024-05-10
Attending: FAMILY MEDICINE
Payer: MEDICARE

## 2024-05-10 ENCOUNTER — TELEPHONE (OUTPATIENT)
Dept: FAMILY MEDICINE | Facility: CLINIC | Age: 69
End: 2024-05-10
Payer: MEDICARE

## 2024-05-10 DIAGNOSIS — I48.91 ATRIAL FIBRILLATION, UNSPECIFIED TYPE: ICD-10-CM

## 2024-05-10 LAB
INR PPP: 2.7
PROTHROMBIN TIME: 26.9 SECONDS (ref 9.1–10.9)

## 2024-05-10 PROCEDURE — 85610 PROTHROMBIN TIME: CPT

## 2024-05-10 PROCEDURE — 36415 COLL VENOUS BLD VENIPUNCTURE: CPT

## 2024-05-10 NOTE — TELEPHONE ENCOUNTER
----- Message from Jimmy Mars MD sent at 5/10/2024  9:26 AM CDT -----  Please inform patient of lab results, which are all within acceptable ranges.

## 2024-05-15 DIAGNOSIS — M10.9 GOUT, UNSPECIFIED CAUSE, UNSPECIFIED CHRONICITY, UNSPECIFIED SITE: Primary | ICD-10-CM

## 2024-05-15 RX ORDER — ALLOPURINOL 100 MG/1
TABLET ORAL
Qty: 60 TABLET | Refills: 3 | Status: SHIPPED | OUTPATIENT
Start: 2024-05-15

## 2024-06-10 ENCOUNTER — TELEPHONE (OUTPATIENT)
Dept: FAMILY MEDICINE | Facility: CLINIC | Age: 69
End: 2024-06-10
Payer: MEDICARE

## 2024-06-10 ENCOUNTER — LAB VISIT (OUTPATIENT)
Dept: LAB | Facility: HOSPITAL | Age: 69
End: 2024-06-10
Attending: FAMILY MEDICINE
Payer: MEDICARE

## 2024-06-10 DIAGNOSIS — I48.91 ATRIAL FIBRILLATION, UNSPECIFIED TYPE: ICD-10-CM

## 2024-06-10 LAB
INR PPP: 3.1
PROTHROMBIN TIME: 30.4 SECONDS (ref 9.1–10.9)

## 2024-06-10 PROCEDURE — 85610 PROTHROMBIN TIME: CPT

## 2024-06-10 PROCEDURE — 36415 COLL VENOUS BLD VENIPUNCTURE: CPT

## 2024-06-10 NOTE — TELEPHONE ENCOUNTER
----- Message from Jimmy Mars MD sent at 6/10/2024  4:16 PM CDT -----  Please inform patient of lab results, which are all within acceptable ranges.  Repeat INR in 1 week

## 2024-06-17 ENCOUNTER — TELEPHONE (OUTPATIENT)
Dept: FAMILY MEDICINE | Facility: CLINIC | Age: 69
End: 2024-06-17
Payer: MEDICARE

## 2024-06-17 ENCOUNTER — LAB VISIT (OUTPATIENT)
Dept: LAB | Facility: HOSPITAL | Age: 69
End: 2024-06-17
Attending: FAMILY MEDICINE
Payer: MEDICARE

## 2024-06-17 DIAGNOSIS — I48.91 ATRIAL FIBRILLATION, UNSPECIFIED TYPE: ICD-10-CM

## 2024-06-17 LAB
INR PPP: 3
PROTHROMBIN TIME: 29.5 SECONDS (ref 9.1–10.9)

## 2024-06-17 PROCEDURE — 85610 PROTHROMBIN TIME: CPT

## 2024-06-17 PROCEDURE — 36415 COLL VENOUS BLD VENIPUNCTURE: CPT

## 2024-06-17 NOTE — TELEPHONE ENCOUNTER
----- Message from Jimmy Mars MD sent at 6/17/2024  9:38 AM CDT -----  Please inform patient of lab results, which are all within acceptable ranges.

## 2024-06-20 DIAGNOSIS — K21.9 GASTROESOPHAGEAL REFLUX DISEASE, UNSPECIFIED WHETHER ESOPHAGITIS PRESENT: ICD-10-CM

## 2024-06-20 RX ORDER — PANTOPRAZOLE SODIUM 40 MG/1
TABLET, DELAYED RELEASE ORAL
Qty: 30 TABLET | Refills: 1 | Status: SHIPPED | OUTPATIENT
Start: 2024-06-20

## 2024-07-03 DIAGNOSIS — I10 HYPERTENSION, UNSPECIFIED TYPE: Primary | ICD-10-CM

## 2024-07-03 DIAGNOSIS — E78.5 HYPERLIPIDEMIA, UNSPECIFIED HYPERLIPIDEMIA TYPE: ICD-10-CM

## 2024-07-03 DIAGNOSIS — Z00.00 WELLNESS EXAMINATION: ICD-10-CM

## 2024-07-03 DIAGNOSIS — Z12.5 SCREENING PSA (PROSTATE SPECIFIC ANTIGEN): ICD-10-CM

## 2024-07-03 DIAGNOSIS — Z11.4 ENCOUNTER FOR HIV (HUMAN IMMUNODEFICIENCY VIRUS) TEST: ICD-10-CM

## 2024-07-03 DIAGNOSIS — Z11.59 NEED FOR HEPATITIS C SCREENING TEST: ICD-10-CM

## 2024-07-08 ENCOUNTER — LAB VISIT (OUTPATIENT)
Dept: LAB | Facility: HOSPITAL | Age: 69
End: 2024-07-08
Attending: FAMILY MEDICINE
Payer: MEDICARE

## 2024-07-08 ENCOUNTER — TELEPHONE (OUTPATIENT)
Dept: FAMILY MEDICINE | Facility: CLINIC | Age: 69
End: 2024-07-08
Payer: MEDICARE

## 2024-07-08 DIAGNOSIS — I48.91 ATRIAL FIBRILLATION, UNSPECIFIED TYPE: ICD-10-CM

## 2024-07-08 DIAGNOSIS — R79.1 ABNORMAL COAGULATION PROFILE: Primary | ICD-10-CM

## 2024-07-08 LAB
INR PPP: 3.3
PROTHROMBIN TIME: 32.5 SECONDS (ref 9.1–10.9)

## 2024-07-08 PROCEDURE — 36415 COLL VENOUS BLD VENIPUNCTURE: CPT

## 2024-07-08 PROCEDURE — 85610 PROTHROMBIN TIME: CPT

## 2024-07-08 NOTE — TELEPHONE ENCOUNTER
----- Message from Jimmy Mars MD sent at 7/8/2024 12:53 PM CDT -----  Please inform patient of results.     1. Decrease Coumadin by 1 mg.  Repeat INR in 1 week

## 2024-07-15 ENCOUNTER — TELEPHONE (OUTPATIENT)
Dept: FAMILY MEDICINE | Facility: CLINIC | Age: 69
End: 2024-07-15
Payer: MEDICARE

## 2024-07-15 ENCOUNTER — LAB VISIT (OUTPATIENT)
Dept: LAB | Facility: HOSPITAL | Age: 69
End: 2024-07-15
Attending: FAMILY MEDICINE
Payer: MEDICARE

## 2024-07-15 DIAGNOSIS — I48.91 ATRIAL FIBRILLATION, UNSPECIFIED TYPE: ICD-10-CM

## 2024-07-15 LAB
INR PPP: 2.8
PROTHROMBIN TIME: 27.6 SECONDS (ref 9.1–10.9)

## 2024-07-15 PROCEDURE — 36415 COLL VENOUS BLD VENIPUNCTURE: CPT

## 2024-07-15 PROCEDURE — 85610 PROTHROMBIN TIME: CPT

## 2024-07-29 ENCOUNTER — LAB VISIT (OUTPATIENT)
Dept: LAB | Facility: HOSPITAL | Age: 69
End: 2024-07-29
Attending: FAMILY MEDICINE
Payer: MEDICARE

## 2024-07-29 DIAGNOSIS — R79.1 ABNORMAL COAGULATION PROFILE: ICD-10-CM

## 2024-07-29 LAB
INR PPP: 2.6
PROTHROMBIN TIME: 25.8 SECONDS (ref 9.1–10.9)

## 2024-07-29 PROCEDURE — 85610 PROTHROMBIN TIME: CPT

## 2024-07-29 PROCEDURE — 36415 COLL VENOUS BLD VENIPUNCTURE: CPT

## 2024-08-05 ENCOUNTER — LAB VISIT (OUTPATIENT)
Dept: LAB | Facility: HOSPITAL | Age: 69
End: 2024-08-05
Attending: FAMILY MEDICINE
Payer: MEDICARE

## 2024-08-05 DIAGNOSIS — Z12.5 SCREENING PSA (PROSTATE SPECIFIC ANTIGEN): ICD-10-CM

## 2024-08-05 DIAGNOSIS — I48.91 ATRIAL FIBRILLATION, UNSPECIFIED TYPE: ICD-10-CM

## 2024-08-05 DIAGNOSIS — Z00.00 WELLNESS EXAMINATION: ICD-10-CM

## 2024-08-05 DIAGNOSIS — I10 HYPERTENSION, UNSPECIFIED TYPE: ICD-10-CM

## 2024-08-05 DIAGNOSIS — Z11.59 NEED FOR HEPATITIS C SCREENING TEST: ICD-10-CM

## 2024-08-05 DIAGNOSIS — E78.5 HYPERLIPIDEMIA, UNSPECIFIED HYPERLIPIDEMIA TYPE: ICD-10-CM

## 2024-08-05 LAB
ALBUMIN SERPL-MCNC: 3.5 G/DL (ref 3.4–4.8)
ALBUMIN/GLOB SERPL: 0.9 RATIO (ref 1.1–2)
ALP SERPL-CCNC: 99 UNIT/L (ref 40–150)
ALT SERPL-CCNC: 11 UNIT/L (ref 0–55)
ANION GAP SERPL CALC-SCNC: 9 MEQ/L
AST SERPL-CCNC: 15 UNIT/L (ref 5–34)
BASOPHILS # BLD AUTO: 0.11 X10(3)/MCL
BASOPHILS NFR BLD AUTO: 1 %
BILIRUB SERPL-MCNC: 0.4 MG/DL
BUN SERPL-MCNC: 28 MG/DL (ref 8.4–25.7)
CALCIUM SERPL-MCNC: 9.7 MG/DL (ref 8.8–10)
CHLORIDE SERPL-SCNC: 104 MMOL/L (ref 98–107)
CHOLEST SERPL-MCNC: 115 MG/DL
CHOLEST/HDLC SERPL: 3 {RATIO} (ref 0–5)
CO2 SERPL-SCNC: 26 MMOL/L (ref 23–31)
CREAT SERPL-MCNC: 1.54 MG/DL (ref 0.73–1.18)
CREAT/UREA NIT SERPL: 18
EOSINOPHIL # BLD AUTO: 0.31 X10(3)/MCL (ref 0–0.9)
EOSINOPHIL NFR BLD AUTO: 2.9 %
ERYTHROCYTE [DISTWIDTH] IN BLOOD BY AUTOMATED COUNT: 15.4 % (ref 11.5–17)
GFR SERPLBLD CREATININE-BSD FMLA CKD-EPI: 48 ML/MIN/1.73/M2
GLOBULIN SER-MCNC: 3.9 GM/DL (ref 2.4–3.5)
GLUCOSE SERPL-MCNC: 95 MG/DL (ref 82–115)
HCT VFR BLD AUTO: 47.6 % (ref 42–52)
HCV AB SERPL QL IA: NONREACTIVE
HDLC SERPL-MCNC: 35 MG/DL (ref 35–60)
HGB BLD-MCNC: 16.1 G/DL (ref 14–18)
IMM GRANULOCYTES # BLD AUTO: 0.03 X10(3)/MCL (ref 0–0.04)
IMM GRANULOCYTES NFR BLD AUTO: 0.3 %
INR PPP: 3.3
LDLC SERPL CALC-MCNC: 58 MG/DL (ref 50–140)
LYMPHOCYTES # BLD AUTO: 3 X10(3)/MCL (ref 0.6–4.6)
LYMPHOCYTES NFR BLD AUTO: 28 %
MCH RBC QN AUTO: 30.6 PG (ref 27–31)
MCHC RBC AUTO-ENTMCNC: 33.8 G/DL (ref 33–36)
MCV RBC AUTO: 90.3 FL (ref 80–94)
MONOCYTES # BLD AUTO: 0.78 X10(3)/MCL (ref 0.1–1.3)
MONOCYTES NFR BLD AUTO: 7.3 %
NEUTROPHILS # BLD AUTO: 6.48 X10(3)/MCL (ref 2.1–9.2)
NEUTROPHILS NFR BLD AUTO: 60.5 %
NRBC BLD AUTO-RTO: 0 %
PLATELET # BLD AUTO: 218 X10(3)/MCL (ref 130–400)
PMV BLD AUTO: 10.1 FL (ref 7.4–10.4)
POTASSIUM SERPL-SCNC: 4.2 MMOL/L (ref 3.5–5.1)
PROT SERPL-MCNC: 7.4 GM/DL (ref 5.8–7.6)
PROTHROMBIN TIME: 32.8 SECONDS (ref 9.1–10.9)
PSA SERPL-MCNC: 1.02 NG/ML
RBC # BLD AUTO: 5.27 X10(6)/MCL (ref 4.7–6.1)
SODIUM SERPL-SCNC: 139 MMOL/L (ref 136–145)
TRIGL SERPL-MCNC: 112 MG/DL (ref 34–140)
VLDLC SERPL CALC-MCNC: 22 MG/DL
WBC # BLD AUTO: 10.71 X10(3)/MCL (ref 4.5–11.5)

## 2024-08-05 PROCEDURE — 36415 COLL VENOUS BLD VENIPUNCTURE: CPT

## 2024-08-05 PROCEDURE — 85610 PROTHROMBIN TIME: CPT

## 2024-08-05 PROCEDURE — 80053 COMPREHEN METABOLIC PANEL: CPT

## 2024-08-05 PROCEDURE — 80061 LIPID PANEL: CPT

## 2024-08-05 PROCEDURE — 84153 ASSAY OF PSA TOTAL: CPT

## 2024-08-05 PROCEDURE — 85025 COMPLETE CBC W/AUTO DIFF WBC: CPT

## 2024-08-05 PROCEDURE — 86803 HEPATITIS C AB TEST: CPT

## 2024-08-08 ENCOUNTER — OFFICE VISIT (OUTPATIENT)
Dept: FAMILY MEDICINE | Facility: CLINIC | Age: 69
End: 2024-08-08
Payer: MEDICARE

## 2024-08-08 VITALS
HEIGHT: 70 IN | TEMPERATURE: 97 F | DIASTOLIC BLOOD PRESSURE: 74 MMHG | OXYGEN SATURATION: 94 % | BODY MASS INDEX: 26.34 KG/M2 | SYSTOLIC BLOOD PRESSURE: 112 MMHG | HEART RATE: 106 BPM | RESPIRATION RATE: 16 BRPM | WEIGHT: 184 LBS

## 2024-08-08 DIAGNOSIS — Z12.11 COLON CANCER SCREENING: ICD-10-CM

## 2024-08-08 DIAGNOSIS — Z00.00 WELLNESS EXAMINATION: Primary | ICD-10-CM

## 2024-08-08 DIAGNOSIS — N18.31 CHRONIC KIDNEY DISEASE, STAGE 3A: ICD-10-CM

## 2024-08-08 DIAGNOSIS — D68.69 OTHER THROMBOPHILIA: ICD-10-CM

## 2024-08-15 ENCOUNTER — DOCUMENTATION ONLY (OUTPATIENT)
Facility: CLINIC | Age: 69
End: 2024-08-15
Payer: MEDICARE

## 2024-08-19 DIAGNOSIS — K21.9 GASTROESOPHAGEAL REFLUX DISEASE, UNSPECIFIED WHETHER ESOPHAGITIS PRESENT: ICD-10-CM

## 2024-08-19 RX ORDER — PANTOPRAZOLE SODIUM 40 MG/1
TABLET, DELAYED RELEASE ORAL
Qty: 30 TABLET | Refills: 11 | Status: SHIPPED | OUTPATIENT
Start: 2024-08-19

## 2024-09-03 DIAGNOSIS — I48.91 ATRIAL FIBRILLATION, UNSPECIFIED TYPE: ICD-10-CM

## 2024-09-03 RX ORDER — WARFARIN 4 MG/1
TABLET ORAL
Qty: 30 TABLET | Refills: 2 | Status: SHIPPED | OUTPATIENT
Start: 2024-09-03

## 2024-09-09 ENCOUNTER — LAB VISIT (OUTPATIENT)
Dept: LAB | Facility: HOSPITAL | Age: 69
End: 2024-09-09
Attending: FAMILY MEDICINE
Payer: MEDICARE

## 2024-09-09 DIAGNOSIS — I48.91 ATRIAL FIBRILLATION, UNSPECIFIED TYPE: ICD-10-CM

## 2024-09-09 LAB
INR PPP: 2.9
PROTHROMBIN TIME: 28.9 SECONDS (ref 9.1–10.9)

## 2024-09-09 PROCEDURE — 85610 PROTHROMBIN TIME: CPT

## 2024-09-09 PROCEDURE — 36415 COLL VENOUS BLD VENIPUNCTURE: CPT

## 2024-09-10 ENCOUNTER — TELEPHONE (OUTPATIENT)
Dept: FAMILY MEDICINE | Facility: CLINIC | Age: 69
End: 2024-09-10
Payer: MEDICARE

## 2024-09-10 NOTE — TELEPHONE ENCOUNTER
----- Message from Stephanie Borges LPN sent at 9/10/2024  9:38 AM CDT -----    ----- Message -----  From: Jimmy Mars MD  Sent: 9/9/2024   9:08 AM CDT  To: Stephanie Borges LPN    Please inform patient of results.     1. INR therapeutic

## 2024-10-09 ENCOUNTER — LAB VISIT (OUTPATIENT)
Dept: LAB | Facility: HOSPITAL | Age: 69
End: 2024-10-09
Attending: FAMILY MEDICINE
Payer: MEDICARE

## 2024-10-09 DIAGNOSIS — I48.91 ATRIAL FIBRILLATION, UNSPECIFIED TYPE: ICD-10-CM

## 2024-10-09 LAB
INR PPP: 2.8
PROTHROMBIN TIME: 28 SECONDS (ref 9.1–10.9)

## 2024-10-09 PROCEDURE — 36415 COLL VENOUS BLD VENIPUNCTURE: CPT

## 2024-10-09 PROCEDURE — 85610 PROTHROMBIN TIME: CPT

## 2024-11-04 DIAGNOSIS — I48.91 ATRIAL FIBRILLATION, UNSPECIFIED TYPE: ICD-10-CM

## 2024-11-04 RX ORDER — WARFARIN 4 MG/1
TABLET ORAL
Qty: 30 TABLET | Refills: 0 | Status: SHIPPED | OUTPATIENT
Start: 2024-11-04

## 2024-11-11 ENCOUNTER — TELEPHONE (OUTPATIENT)
Dept: FAMILY MEDICINE | Facility: CLINIC | Age: 69
End: 2024-11-11
Payer: MEDICARE

## 2024-11-11 ENCOUNTER — LAB VISIT (OUTPATIENT)
Dept: LAB | Facility: HOSPITAL | Age: 69
End: 2024-11-11
Attending: FAMILY MEDICINE
Payer: MEDICARE

## 2024-11-11 DIAGNOSIS — I48.91 ATRIAL FIBRILLATION, UNSPECIFIED TYPE: ICD-10-CM

## 2024-11-11 LAB
INR PPP: 3.1
PROTHROMBIN TIME: 30 SECONDS (ref 9.1–10.9)

## 2024-11-11 PROCEDURE — 36415 COLL VENOUS BLD VENIPUNCTURE: CPT

## 2024-11-11 PROCEDURE — 85610 PROTHROMBIN TIME: CPT

## 2024-11-11 NOTE — TELEPHONE ENCOUNTER
----- Message from Jimmy Mars MD sent at 11/11/2024  3:15 PM CST -----  Please inform patient of results.     1. Continue current medication.  Repeat INR in 1 week

## 2024-11-18 ENCOUNTER — LAB VISIT (OUTPATIENT)
Dept: LAB | Facility: HOSPITAL | Age: 69
End: 2024-11-18
Attending: FAMILY MEDICINE
Payer: MEDICARE

## 2024-11-18 ENCOUNTER — TELEPHONE (OUTPATIENT)
Dept: FAMILY MEDICINE | Facility: CLINIC | Age: 69
End: 2024-11-18
Payer: MEDICARE

## 2024-11-18 DIAGNOSIS — I48.91 ATRIAL FIBRILLATION, UNSPECIFIED TYPE: ICD-10-CM

## 2024-11-18 LAB
INR PPP: 2.4
PROTHROMBIN TIME: 23.8 SECONDS (ref 9.1–10.9)

## 2024-11-18 PROCEDURE — 36415 COLL VENOUS BLD VENIPUNCTURE: CPT

## 2024-11-18 PROCEDURE — 85610 PROTHROMBIN TIME: CPT

## 2024-11-18 NOTE — TELEPHONE ENCOUNTER
----- Message from Jimmy Mars MD sent at 11/18/2024  9:50 AM CST -----  Please inform patient of INR results, which are all within acceptable ranges.  Repeat INR in 2 weeks

## 2024-11-18 NOTE — PROGRESS NOTES
Please inform patient of INR results, which are all within acceptable ranges.  Repeat INR in 2 weeks

## 2024-12-09 ENCOUNTER — LAB VISIT (OUTPATIENT)
Dept: LAB | Facility: HOSPITAL | Age: 69
End: 2024-12-09
Attending: FAMILY MEDICINE
Payer: MEDICARE

## 2024-12-09 ENCOUNTER — TELEPHONE (OUTPATIENT)
Dept: FAMILY MEDICINE | Facility: CLINIC | Age: 69
End: 2024-12-09
Payer: MEDICARE

## 2024-12-09 DIAGNOSIS — I48.91 ATRIAL FIBRILLATION, UNSPECIFIED TYPE: ICD-10-CM

## 2024-12-09 DIAGNOSIS — I48.91 ATRIAL FIBRILLATION, UNSPECIFIED TYPE: Primary | ICD-10-CM

## 2024-12-09 DIAGNOSIS — R79.1 ABNORMAL COAGULATION PROFILE: Primary | ICD-10-CM

## 2024-12-09 LAB
INR PPP: 3.3
PROTHROMBIN TIME: 31.3 SECONDS (ref 9.1–10.9)

## 2024-12-09 PROCEDURE — 85610 PROTHROMBIN TIME: CPT

## 2024-12-09 PROCEDURE — 36415 COLL VENOUS BLD VENIPUNCTURE: CPT

## 2024-12-09 NOTE — TELEPHONE ENCOUNTER
----- Message from Jimmy Mars MD sent at 12/9/2024  1:57 PM CST -----  Please inform patient of results.     1. Continue current medication.  Repeat INR in 3 days

## 2024-12-12 ENCOUNTER — TELEPHONE (OUTPATIENT)
Dept: FAMILY MEDICINE | Facility: CLINIC | Age: 69
End: 2024-12-12
Payer: MEDICARE

## 2024-12-12 ENCOUNTER — LAB VISIT (OUTPATIENT)
Dept: LAB | Facility: HOSPITAL | Age: 69
End: 2024-12-12
Attending: FAMILY MEDICINE
Payer: MEDICARE

## 2024-12-12 DIAGNOSIS — R79.1 ABNORMAL COAGULATION PROFILE: ICD-10-CM

## 2024-12-12 DIAGNOSIS — R79.1 ABNORMAL COAGULATION PROFILE: Primary | ICD-10-CM

## 2024-12-12 LAB
INR PPP: 2.1
PROTHROMBIN TIME: 20.5 SECONDS (ref 9.1–10.9)

## 2024-12-12 PROCEDURE — 85610 PROTHROMBIN TIME: CPT

## 2024-12-12 PROCEDURE — 36415 COLL VENOUS BLD VENIPUNCTURE: CPT

## 2024-12-12 NOTE — PROGRESS NOTES
Please inform patient of results.     1. INR therapeutic.  Continue current dose.  Repeat INR in 1 week

## 2024-12-12 NOTE — TELEPHONE ENCOUNTER
----- Message from Jimmy Mars MD sent at 12/12/2024 12:55 PM CST -----  Please inform patient of results.     1. INR therapeutic.  Continue current dose.  Repeat INR in 1 week

## 2024-12-19 ENCOUNTER — LAB VISIT (OUTPATIENT)
Dept: LAB | Facility: HOSPITAL | Age: 69
End: 2024-12-19
Attending: FAMILY MEDICINE
Payer: MEDICARE

## 2024-12-19 ENCOUNTER — TELEPHONE (OUTPATIENT)
Dept: FAMILY MEDICINE | Facility: CLINIC | Age: 69
End: 2024-12-19
Payer: MEDICARE

## 2024-12-19 DIAGNOSIS — R79.1 ABNORMAL COAGULATION PROFILE: ICD-10-CM

## 2024-12-19 LAB
INR PPP: 2.3
PROTHROMBIN TIME: 22.9 SECONDS (ref 9.1–10.9)

## 2024-12-19 PROCEDURE — 85610 PROTHROMBIN TIME: CPT

## 2024-12-19 PROCEDURE — 36415 COLL VENOUS BLD VENIPUNCTURE: CPT

## 2024-12-19 NOTE — PROGRESS NOTES
Please inform patient of results.     1. INR therapeutic.  Repeat INR in 2 weeks Abnormal HR/Temp at home >= 38 C

## 2024-12-19 NOTE — TELEPHONE ENCOUNTER
----- Message from Jimmy Mars MD sent at 12/19/2024 12:33 PM CST -----  Please inform patient of results.     1. INR therapeutic.  Repeat INR in 2 weeks

## 2025-01-02 ENCOUNTER — LAB VISIT (OUTPATIENT)
Dept: LAB | Facility: HOSPITAL | Age: 70
End: 2025-01-02
Attending: FAMILY MEDICINE
Payer: MEDICARE

## 2025-01-02 ENCOUNTER — TELEPHONE (OUTPATIENT)
Dept: FAMILY MEDICINE | Facility: CLINIC | Age: 70
End: 2025-01-02
Payer: MEDICARE

## 2025-01-02 DIAGNOSIS — R79.1 ABNORMAL COAGULATION PROFILE: Primary | ICD-10-CM

## 2025-01-02 DIAGNOSIS — R79.1 ABNORMAL COAGULATION PROFILE: ICD-10-CM

## 2025-01-02 LAB
INR PPP: 3
PROTHROMBIN TIME: 29 SECONDS (ref 9.1–10.9)

## 2025-01-02 PROCEDURE — 36415 COLL VENOUS BLD VENIPUNCTURE: CPT

## 2025-01-02 PROCEDURE — 85610 PROTHROMBIN TIME: CPT

## 2025-01-02 NOTE — TELEPHONE ENCOUNTER
----- Message from Jimmy Mars MD sent at 1/2/2025  2:36 PM CST -----  Please inform patient of results.     1. INR therapeutic.  Repeat INR in 2 weeks

## 2025-01-09 DIAGNOSIS — M10.9 GOUT, UNSPECIFIED CAUSE, UNSPECIFIED CHRONICITY, UNSPECIFIED SITE: ICD-10-CM

## 2025-01-09 RX ORDER — ALLOPURINOL 100 MG/1
TABLET ORAL
Qty: 60 TABLET | Refills: 3 | Status: SHIPPED | OUTPATIENT
Start: 2025-01-09

## 2025-01-16 ENCOUNTER — LAB VISIT (OUTPATIENT)
Dept: LAB | Facility: HOSPITAL | Age: 70
End: 2025-01-16
Attending: FAMILY MEDICINE
Payer: MEDICARE

## 2025-01-16 ENCOUNTER — TELEPHONE (OUTPATIENT)
Dept: FAMILY MEDICINE | Facility: CLINIC | Age: 70
End: 2025-01-16
Payer: MEDICARE

## 2025-01-16 DIAGNOSIS — R79.1 ABNORMAL COAGULATION PROFILE: ICD-10-CM

## 2025-01-16 DIAGNOSIS — R79.1 ABNORMAL COAGULATION PROFILE: Primary | ICD-10-CM

## 2025-01-16 DIAGNOSIS — I48.91 ATRIAL FIBRILLATION, UNSPECIFIED TYPE: ICD-10-CM

## 2025-01-16 LAB
INR PPP: 3.4
PROTHROMBIN TIME: 32.3 SECONDS (ref 9.1–10.9)

## 2025-01-16 PROCEDURE — 36415 COLL VENOUS BLD VENIPUNCTURE: CPT

## 2025-01-16 PROCEDURE — 85610 PROTHROMBIN TIME: CPT

## 2025-01-16 RX ORDER — WARFARIN 3 MG/1
3 TABLET ORAL DAILY
Qty: 7 TABLET | Refills: 1 | Status: SHIPPED | OUTPATIENT
Start: 2025-01-16 | End: 2025-01-24 | Stop reason: SDUPTHER

## 2025-01-16 NOTE — PROGRESS NOTES
Please inform patient of results.    1. Decrease dose of Coumadin to 3 mg by mouth daily; repeat INR in 1 week.

## 2025-01-16 NOTE — TELEPHONE ENCOUNTER
----- Message from Yazmin Mccollum NP sent at 1/16/2025  3:15 PM CST -----  Please inform patient of results.    1. Decrease dose of Coumadin to 3 mg by mouth daily; repeat INR in 1 week.

## 2025-01-24 ENCOUNTER — LAB VISIT (OUTPATIENT)
Dept: LAB | Facility: HOSPITAL | Age: 70
End: 2025-01-24
Attending: FAMILY MEDICINE
Payer: MEDICARE

## 2025-01-24 ENCOUNTER — TELEPHONE (OUTPATIENT)
Dept: FAMILY MEDICINE | Facility: CLINIC | Age: 70
End: 2025-01-24
Payer: MEDICARE

## 2025-01-24 DIAGNOSIS — I48.91 ATRIAL FIBRILLATION, UNSPECIFIED TYPE: ICD-10-CM

## 2025-01-24 DIAGNOSIS — R79.1 ABNORMAL COAGULATION PROFILE: ICD-10-CM

## 2025-01-24 LAB
INR PPP: 1.8
PROTHROMBIN TIME: 18.3 SECONDS (ref 9.1–10.9)

## 2025-01-24 PROCEDURE — 36415 COLL VENOUS BLD VENIPUNCTURE: CPT

## 2025-01-24 PROCEDURE — 85610 PROTHROMBIN TIME: CPT

## 2025-01-24 RX ORDER — WARFARIN 3 MG/1
3 TABLET ORAL DAILY
Qty: 30 TABLET | Refills: 3 | Status: SHIPPED | OUTPATIENT
Start: 2025-01-24

## 2025-01-24 NOTE — TELEPHONE ENCOUNTER
----- Message from Yazmin Mccollum NP sent at 1/24/2025  9:15 AM CST -----  Please inform patient of results.    1. INR subtherapeutic at 1.8, take Coumadin 6 mg x1 dose and then resume 3 mg by mouth daily.  Repeat INR in 1 week.

## 2025-01-24 NOTE — PROGRESS NOTES
Please inform patient of results.    1. INR subtherapeutic at 1.8, take Coumadin 6 mg x1 dose and then resume 3 mg by mouth daily.  Repeat INR in 1 week.

## 2025-01-30 ENCOUNTER — LAB VISIT (OUTPATIENT)
Dept: LAB | Facility: HOSPITAL | Age: 70
End: 2025-01-30
Attending: FAMILY MEDICINE
Payer: MEDICARE

## 2025-01-30 DIAGNOSIS — I48.91 ATRIAL FIBRILLATION, UNSPECIFIED TYPE: ICD-10-CM

## 2025-01-30 DIAGNOSIS — I48.91 ATRIAL FIBRILLATION, UNSPECIFIED TYPE: Primary | ICD-10-CM

## 2025-01-30 LAB
INR PPP: 2
PROTHROMBIN TIME: 19.4 SECONDS (ref 9.1–10.9)

## 2025-01-30 PROCEDURE — 85610 PROTHROMBIN TIME: CPT

## 2025-01-30 PROCEDURE — 36415 COLL VENOUS BLD VENIPUNCTURE: CPT

## 2025-01-30 NOTE — PROGRESS NOTES
Spoke with patient regarding PT/INR within acceptable range on Coumadin. Continue Coumadin 3 mg by mouth daily. Repeat INR in 1 week to ensure levels stay therapeutic. Order was placed.

## 2025-02-06 ENCOUNTER — LAB VISIT (OUTPATIENT)
Dept: LAB | Facility: HOSPITAL | Age: 70
End: 2025-02-06
Payer: MEDICARE

## 2025-02-06 ENCOUNTER — TELEPHONE (OUTPATIENT)
Dept: FAMILY MEDICINE | Facility: CLINIC | Age: 70
End: 2025-02-06
Payer: MEDICARE

## 2025-02-06 DIAGNOSIS — I48.91 ATRIAL FIBRILLATION, UNSPECIFIED TYPE: ICD-10-CM

## 2025-02-06 LAB
INR PPP: 2
PROTHROMBIN TIME: 20 SECONDS (ref 9.1–10.9)

## 2025-02-06 PROCEDURE — 36415 COLL VENOUS BLD VENIPUNCTURE: CPT

## 2025-02-06 PROCEDURE — 85610 PROTHROMBIN TIME: CPT

## 2025-02-06 NOTE — PROGRESS NOTES
Please inform patient of results.    1. INR within acceptable range. Continue Coumadin 3 mg by mouth daily.  Repeat INR in 2 weeks.  Order placed.

## 2025-02-06 NOTE — TELEPHONE ENCOUNTER
----- Message from Yazmin Mccollum NP sent at 2/6/2025 10:11 AM CST -----  Please inform patient of results.    1. INR within acceptable range. Continue Coumadin 3 mg by mouth daily.  Repeat INR in 2 weeks.  Order placed.

## 2025-02-06 NOTE — TELEPHONE ENCOUNTER
Yazmin Mccollum NP [place], [place]  Please inform patient of results.    1. INR within acceptable range. Continue Coumadin 3 mg by mouth daily.  Repeat INR in 2 weeks.  Order placed.

## 2025-02-10 ENCOUNTER — OFFICE VISIT (OUTPATIENT)
Dept: FAMILY MEDICINE | Facility: CLINIC | Age: 70
End: 2025-02-10
Payer: MEDICARE

## 2025-02-10 VITALS
HEIGHT: 70 IN | SYSTOLIC BLOOD PRESSURE: 108 MMHG | DIASTOLIC BLOOD PRESSURE: 72 MMHG | TEMPERATURE: 97 F | WEIGHT: 179 LBS | OXYGEN SATURATION: 95 % | HEART RATE: 88 BPM | BODY MASS INDEX: 25.62 KG/M2 | RESPIRATION RATE: 18 BRPM

## 2025-02-10 DIAGNOSIS — M62.08 DIASTASIS RECTI: ICD-10-CM

## 2025-02-10 DIAGNOSIS — I50.22 CHRONIC SYSTOLIC (CONGESTIVE) HEART FAILURE: ICD-10-CM

## 2025-02-10 DIAGNOSIS — I10 HYPERTENSION, UNSPECIFIED TYPE: Primary | ICD-10-CM

## 2025-02-10 DIAGNOSIS — I48.91 ATRIAL FIBRILLATION, UNSPECIFIED TYPE: ICD-10-CM

## 2025-02-10 DIAGNOSIS — Z12.11 COLON CANCER SCREENING: ICD-10-CM

## 2025-02-10 DIAGNOSIS — N18.31 CHRONIC KIDNEY DISEASE, STAGE 3A: ICD-10-CM

## 2025-02-10 PROCEDURE — 1126F AMNT PAIN NOTED NONE PRSNT: CPT | Mod: CPTII,,, | Performed by: FAMILY MEDICINE

## 2025-02-10 PROCEDURE — 4010F ACE/ARB THERAPY RXD/TAKEN: CPT | Mod: CPTII,,, | Performed by: FAMILY MEDICINE

## 2025-02-10 PROCEDURE — 3078F DIAST BP <80 MM HG: CPT | Mod: CPTII,,, | Performed by: FAMILY MEDICINE

## 2025-02-10 PROCEDURE — 3074F SYST BP LT 130 MM HG: CPT | Mod: CPTII,,, | Performed by: FAMILY MEDICINE

## 2025-02-10 PROCEDURE — 1160F RVW MEDS BY RX/DR IN RCRD: CPT | Mod: CPTII,,, | Performed by: FAMILY MEDICINE

## 2025-02-10 PROCEDURE — 3008F BODY MASS INDEX DOCD: CPT | Mod: CPTII,,, | Performed by: FAMILY MEDICINE

## 2025-02-10 PROCEDURE — 99214 OFFICE O/P EST MOD 30 MIN: CPT | Mod: ,,, | Performed by: FAMILY MEDICINE

## 2025-02-10 PROCEDURE — 1159F MED LIST DOCD IN RCRD: CPT | Mod: CPTII,,, | Performed by: FAMILY MEDICINE

## 2025-02-10 NOTE — PROGRESS NOTES
"Subjective:     Patient ID: Carl Mccollum Sr is a 69 y.o. male.    Chief Complaint: 6 month f/u        History of Present Illness    CHIEF COMPLAINT:  Patient presents today for follow up    ABDOMINAL HERNIA:  He presents with a football-shaped protrusion in the middle of abdomen with increasing discomfort. He denies being followed by any physician for this condition.    ANTICOAGULATION:  He reports INR is 2.0, unchanged from previous week. Currently taking Coumadin 3 mg.      ROS:  Gastrointestinal: +abdominal pain        Review of Systems   Constitutional: Negative.    Respiratory: Negative.     Cardiovascular: Negative.            Objective:     /72   Pulse 88   Temp 97.1 °F (36.2 °C) (Temporal)   Resp 18   Ht 5' 9.69" (1.77 m)   Wt 81.2 kg (179 lb)   SpO2 95%   BMI 25.92 kg/m²    Physical Exam  Constitutional:       Appearance: Normal appearance.   Cardiovascular:      Rate and Rhythm: Normal rate and regular rhythm.      Heart sounds: Normal heart sounds.   Pulmonary:      Effort: Pulmonary effort is normal.      Breath sounds: Normal breath sounds.   Abdominal:      Hernia: A hernia is present.   Neurological:      Mental Status: He is alert.   Psychiatric:         Mood and Affect: Mood normal.         Behavior: Behavior normal.         Thought Content: Thought content normal.         Judgment: Judgment normal.             Assessment:     Problem List Items Addressed This Visit          Cardiac/Vascular    Atrial fibrillation    Current Assessment & Plan     Followed by Dr. Alberto   Continue Coumadin         Hypertension - Primary    Chronic systolic (congestive) heart failure    Current Assessment & Plan     Followed by Dr. Alberto   Continue amlodipine/HCTZ/olmesartan/sotalol            Renal/    Chronic kidney disease, stage 3a    Current Assessment & Plan     Currently not followed by nephrology   Trending lab work          Other Visit Diagnoses       Colon cancer screening                 Plan: "   1. Hypertension, unspecified type  Controlled  Continue current Rx medication  Return to clinic with any concerns    2. Chronic systolic (congestive) heart failure  Assessment & Plan:  Followed by Dr. Alberto   Continue amlodipine/HCTZ/olmesartan/sotalol    3. Atrial fibrillation, unspecified type  Assessment & Plan:  Followed by Dr. Alberto   Continue Coumadin    4. Chronic kidney disease, stage 3a  Assessment & Plan:  Currently not followed by nephrology   Trending lab work    5. Colon cancer screening  Patient defers colonoscopy at this time   Readdress at next visit                 This note was generated with the assistance of ambient listening technology. Verbal consent was obtained by the patient and accompanying visitor(s) for the recording of patient appointment to facilitate this note. I attest to having reviewed and edited the generated note for accuracy, though some syntax or spelling errors may persist. Please contact the author of this note for any clarification.

## 2025-02-19 ENCOUNTER — RESULTS FOLLOW-UP (OUTPATIENT)
Dept: FAMILY MEDICINE | Facility: CLINIC | Age: 70
End: 2025-02-19
Payer: MEDICARE

## 2025-02-19 ENCOUNTER — LAB VISIT (OUTPATIENT)
Dept: LAB | Facility: HOSPITAL | Age: 70
End: 2025-02-19
Payer: MEDICARE

## 2025-02-19 DIAGNOSIS — I48.91 ATRIAL FIBRILLATION, UNSPECIFIED TYPE: ICD-10-CM

## 2025-02-19 LAB
INR PPP: 1.9
PROTHROMBIN TIME: 18.6 SECONDS (ref 9.1–10.9)

## 2025-02-19 PROCEDURE — 85610 PROTHROMBIN TIME: CPT

## 2025-02-19 PROCEDURE — 36415 COLL VENOUS BLD VENIPUNCTURE: CPT

## 2025-02-19 RX ORDER — WARFARIN 4 MG/1
4 TABLET ORAL DAILY
COMMUNITY

## 2025-02-19 NOTE — TELEPHONE ENCOUNTER
----- Message from Nurse Brown sent at 2/19/2025  1:23 PM CST -----    ----- Message -----  From: Jimmy Mars MD  Sent: 2/19/2025  11:46 AM CST  To: Stephanie Borges LPN    Please inform patient of results.     1. Increase Coumadin by 1 mg.  Repeat INR in 1 week  ----- Message -----  From: Lab, Background User  Sent: 2/19/2025  11:45 AM CST  To: Jimmy Mars MD

## 2025-02-27 DIAGNOSIS — I48.91 ATRIAL FIBRILLATION, UNSPECIFIED TYPE: Primary | ICD-10-CM

## 2025-02-28 ENCOUNTER — RESULTS FOLLOW-UP (OUTPATIENT)
Dept: FAMILY MEDICINE | Facility: CLINIC | Age: 70
End: 2025-02-28
Payer: MEDICARE

## 2025-02-28 ENCOUNTER — LAB VISIT (OUTPATIENT)
Dept: LAB | Facility: HOSPITAL | Age: 70
End: 2025-02-28
Attending: FAMILY MEDICINE
Payer: MEDICARE

## 2025-02-28 DIAGNOSIS — I48.91 ATRIAL FIBRILLATION, UNSPECIFIED TYPE: ICD-10-CM

## 2025-02-28 DIAGNOSIS — I48.91 ATRIAL FIBRILLATION, UNSPECIFIED TYPE: Primary | ICD-10-CM

## 2025-02-28 LAB
INR PPP: 3.1
PROTHROMBIN TIME: 29.6 SECONDS (ref 9.3–11.4)

## 2025-02-28 PROCEDURE — 36415 COLL VENOUS BLD VENIPUNCTURE: CPT

## 2025-02-28 PROCEDURE — 85610 PROTHROMBIN TIME: CPT

## 2025-02-28 NOTE — TELEPHONE ENCOUNTER
----- Message from Jimmy Mars MD sent at 2/28/2025 11:02 AM CST -----  Please inform patient of results.     1. Hold Coumadin one more day then restart Coumadin.  Repeat INR in 1 week  ----- Message -----  From: Lab, Background User  Sent: 2/28/2025  10:53 AM CST  To: Jimmy Mars MD

## 2025-02-28 NOTE — PROGRESS NOTES
Please inform patient of results.     1. Hold Coumadin one more day then restart Coumadin.  Repeat INR in 1 week

## 2025-03-07 ENCOUNTER — LAB VISIT (OUTPATIENT)
Dept: LAB | Facility: HOSPITAL | Age: 70
End: 2025-03-07
Attending: FAMILY MEDICINE
Payer: MEDICARE

## 2025-03-07 ENCOUNTER — RESULTS FOLLOW-UP (OUTPATIENT)
Dept: FAMILY MEDICINE | Facility: CLINIC | Age: 70
End: 2025-03-07
Payer: MEDICARE

## 2025-03-07 DIAGNOSIS — I48.91 ATRIAL FIBRILLATION, UNSPECIFIED TYPE: Primary | ICD-10-CM

## 2025-03-07 DIAGNOSIS — I48.91 ATRIAL FIBRILLATION, UNSPECIFIED TYPE: ICD-10-CM

## 2025-03-07 LAB
INR PPP: 3.1
PROTHROMBIN TIME: 29.6 SECONDS (ref 9.3–11.4)

## 2025-03-07 PROCEDURE — 85610 PROTHROMBIN TIME: CPT

## 2025-03-07 PROCEDURE — 36415 COLL VENOUS BLD VENIPUNCTURE: CPT

## 2025-03-07 NOTE — TELEPHONE ENCOUNTER
----- Message from Yazmin Mccollum NP sent at 3/7/2025 10:19 AM CST -----  Please inform patient of results.    1. Hold Coumadin 1 day then resume 4 mg daily.  Repeat INR in 1 week.  Lab order placed.  ----- Message -----  From: Lab, Background User  Sent: 3/7/2025   8:08 AM CST  To: Jimmy Mars MD

## 2025-03-07 NOTE — PROGRESS NOTES
Please inform patient of results.    1. Hold Coumadin 1 day then resume 4 mg daily.  Repeat INR in 1 week.  Lab order placed.

## 2025-03-14 ENCOUNTER — RESULTS FOLLOW-UP (OUTPATIENT)
Dept: FAMILY MEDICINE | Facility: CLINIC | Age: 70
End: 2025-03-14

## 2025-03-14 ENCOUNTER — LAB VISIT (OUTPATIENT)
Dept: LAB | Facility: HOSPITAL | Age: 70
End: 2025-03-14
Payer: MEDICARE

## 2025-03-14 ENCOUNTER — TELEPHONE (OUTPATIENT)
Dept: FAMILY MEDICINE | Facility: CLINIC | Age: 70
End: 2025-03-14
Payer: MEDICARE

## 2025-03-14 DIAGNOSIS — I48.91 ATRIAL FIBRILLATION, UNSPECIFIED TYPE: Primary | ICD-10-CM

## 2025-03-14 DIAGNOSIS — I48.91 ATRIAL FIBRILLATION, UNSPECIFIED TYPE: ICD-10-CM

## 2025-03-14 LAB
INR PPP: 3
PROTHROMBIN TIME: 28.9 SECONDS (ref 9.3–11.4)

## 2025-03-14 PROCEDURE — 85610 PROTHROMBIN TIME: CPT

## 2025-03-14 PROCEDURE — 36415 COLL VENOUS BLD VENIPUNCTURE: CPT

## 2025-03-14 NOTE — TELEPHONE ENCOUNTER
----- Message from Yazmin Mccollum NP sent at 3/14/2025  8:49 AM CDT -----  Please inform patient of results.    1. INR within acceptable range.  Repeat in 1 week.  ----- Message -----  From: Lab, Background User  Sent: 3/14/2025   8:44 AM CDT  To: Yazmin Mccollum NP

## 2025-03-21 ENCOUNTER — LAB VISIT (OUTPATIENT)
Dept: LAB | Facility: HOSPITAL | Age: 70
End: 2025-03-21
Attending: FAMILY MEDICINE
Payer: MEDICARE

## 2025-03-21 ENCOUNTER — RESULTS FOLLOW-UP (OUTPATIENT)
Dept: FAMILY MEDICINE | Facility: CLINIC | Age: 70
End: 2025-03-21

## 2025-03-21 DIAGNOSIS — I48.91 ATRIAL FIBRILLATION, UNSPECIFIED TYPE: ICD-10-CM

## 2025-03-21 LAB
INR PPP: 3.1
PROTHROMBIN TIME: 29.7 SECONDS (ref 9.3–11.4)

## 2025-03-21 PROCEDURE — 85610 PROTHROMBIN TIME: CPT

## 2025-03-21 PROCEDURE — 36415 COLL VENOUS BLD VENIPUNCTURE: CPT

## 2025-03-25 DIAGNOSIS — I48.91 ATRIAL FIBRILLATION, UNSPECIFIED TYPE: Primary | ICD-10-CM

## 2025-03-25 RX ORDER — WARFARIN 4 MG/1
4 TABLET ORAL
Qty: 30 TABLET | Refills: 0 | Status: SHIPPED | OUTPATIENT
Start: 2025-03-25

## 2025-03-28 ENCOUNTER — TELEPHONE (OUTPATIENT)
Dept: FAMILY MEDICINE | Facility: CLINIC | Age: 70
End: 2025-03-28
Payer: MEDICARE

## 2025-03-28 ENCOUNTER — LAB VISIT (OUTPATIENT)
Dept: LAB | Facility: HOSPITAL | Age: 70
End: 2025-03-28
Attending: FAMILY MEDICINE
Payer: MEDICARE

## 2025-03-28 ENCOUNTER — RESULTS FOLLOW-UP (OUTPATIENT)
Dept: FAMILY MEDICINE | Facility: CLINIC | Age: 70
End: 2025-03-28

## 2025-03-28 DIAGNOSIS — I48.91 ATRIAL FIBRILLATION, UNSPECIFIED TYPE: ICD-10-CM

## 2025-03-28 DIAGNOSIS — R79.1 ABNORMAL COAGULATION PROFILE: Primary | ICD-10-CM

## 2025-03-28 DIAGNOSIS — I48.91 ATRIAL FIBRILLATION, UNSPECIFIED TYPE: Primary | ICD-10-CM

## 2025-03-28 LAB
INR PPP: 2.8
PROTHROMBIN TIME: 27.5 SECONDS (ref 9.3–11.4)

## 2025-03-28 PROCEDURE — 36415 COLL VENOUS BLD VENIPUNCTURE: CPT

## 2025-03-28 PROCEDURE — 85610 PROTHROMBIN TIME: CPT

## 2025-03-28 NOTE — TELEPHONE ENCOUNTER
----- Message from Jimmy Mars MD sent at 3/28/2025  9:11 AM CDT -----  Please inform patient of results.     1. INR therapeutic.  Repeat INR in 2 weeks  ----- Message -----  From: Lab, Background User  Sent: 3/28/2025   8:28 AM CDT  To: Jimmy Mars MD

## 2025-04-04 NOTE — PROGRESS NOTES
Please inform patient of lab results, which are all within acceptable ranges. Urine ph slightly high.  Glucose mildly high, sodium mildly low- can recheck fasting prior to next visit  Normal testosterone

## 2025-04-11 ENCOUNTER — LAB VISIT (OUTPATIENT)
Dept: LAB | Facility: HOSPITAL | Age: 70
End: 2025-04-11
Attending: FAMILY MEDICINE
Payer: MEDICARE

## 2025-04-11 ENCOUNTER — RESULTS FOLLOW-UP (OUTPATIENT)
Dept: FAMILY MEDICINE | Facility: CLINIC | Age: 70
End: 2025-04-11

## 2025-04-11 ENCOUNTER — TELEPHONE (OUTPATIENT)
Dept: FAMILY MEDICINE | Facility: CLINIC | Age: 70
End: 2025-04-11
Payer: MEDICARE

## 2025-04-11 DIAGNOSIS — R79.1 ABNORMAL COAGULATION PROFILE: Primary | ICD-10-CM

## 2025-04-11 DIAGNOSIS — R79.1 ABNORMAL COAGULATION PROFILE: ICD-10-CM

## 2025-04-11 LAB
INR PPP: 3.2
PROTHROMBIN TIME: 30.6 SECONDS (ref 9.3–11.4)

## 2025-04-11 PROCEDURE — 85610 PROTHROMBIN TIME: CPT

## 2025-04-11 PROCEDURE — 36415 COLL VENOUS BLD VENIPUNCTURE: CPT

## 2025-04-11 NOTE — TELEPHONE ENCOUNTER
----- Message from Jimmy Mars MD sent at 4/11/2025 10:04 AM CDT -----  Please inform patient of results.     1. Hold Coumadin 1 day.  Repeat INR in 2 weeks  ----- Message -----  From: Lab, Background User  Sent: 4/11/2025   8:06 AM CDT  To: Jimmy Mars MD

## 2025-04-25 ENCOUNTER — LAB VISIT (OUTPATIENT)
Dept: LAB | Facility: HOSPITAL | Age: 70
End: 2025-04-25
Attending: FAMILY MEDICINE
Payer: MEDICARE

## 2025-04-25 ENCOUNTER — RESULTS FOLLOW-UP (OUTPATIENT)
Dept: FAMILY MEDICINE | Facility: CLINIC | Age: 70
End: 2025-04-25

## 2025-04-25 ENCOUNTER — TELEPHONE (OUTPATIENT)
Dept: FAMILY MEDICINE | Facility: CLINIC | Age: 70
End: 2025-04-25
Payer: MEDICARE

## 2025-04-25 DIAGNOSIS — R79.1 ABNORMAL COAGULATION PROFILE: ICD-10-CM

## 2025-04-25 DIAGNOSIS — R79.1 ABNORMAL COAGULATION PROFILE: Primary | ICD-10-CM

## 2025-04-25 LAB
INR PPP: 2.8
PROTHROMBIN TIME: 27.3 SECONDS (ref 9.3–11.4)

## 2025-04-25 PROCEDURE — 85610 PROTHROMBIN TIME: CPT

## 2025-04-25 PROCEDURE — 36415 COLL VENOUS BLD VENIPUNCTURE: CPT

## 2025-04-25 NOTE — TELEPHONE ENCOUNTER
----- Message from Jimmy Mars MD sent at 4/25/2025 11:32 AM CDT -----  Please inform patient of results.     1. Continue current Coumadin dose.  Repeat INR in 1 month  ----- Message -----  From: Lab, Background User  Sent: 4/25/2025   7:48 AM CDT  To: Jimmy Mars MD

## 2025-05-02 DIAGNOSIS — I48.91 ATRIAL FIBRILLATION, UNSPECIFIED TYPE: ICD-10-CM

## 2025-05-02 RX ORDER — WARFARIN 4 MG/1
4 TABLET ORAL
Qty: 30 TABLET | Refills: 1 | Status: SHIPPED | OUTPATIENT
Start: 2025-05-02

## 2025-05-09 DIAGNOSIS — M10.9 GOUT, UNSPECIFIED CAUSE, UNSPECIFIED CHRONICITY, UNSPECIFIED SITE: ICD-10-CM

## 2025-05-09 RX ORDER — ALLOPURINOL 100 MG/1
200 TABLET ORAL
Qty: 60 TABLET | Refills: 3 | Status: SHIPPED | OUTPATIENT
Start: 2025-05-09

## 2025-05-23 ENCOUNTER — RESULTS FOLLOW-UP (OUTPATIENT)
Dept: FAMILY MEDICINE | Facility: CLINIC | Age: 70
End: 2025-05-23

## 2025-05-23 ENCOUNTER — TELEPHONE (OUTPATIENT)
Dept: FAMILY MEDICINE | Facility: CLINIC | Age: 70
End: 2025-05-23
Payer: MEDICARE

## 2025-05-23 ENCOUNTER — LAB VISIT (OUTPATIENT)
Dept: LAB | Facility: HOSPITAL | Age: 70
End: 2025-05-23
Attending: FAMILY MEDICINE
Payer: MEDICARE

## 2025-05-23 DIAGNOSIS — Z79.01 ANTICOAGULATED ON COUMADIN: Primary | ICD-10-CM

## 2025-05-23 DIAGNOSIS — R79.1 ABNORMAL COAGULATION PROFILE: ICD-10-CM

## 2025-05-23 LAB
INR PPP: 2.5
PROTHROMBIN TIME: 24.2 SECONDS (ref 9.3–11.4)

## 2025-05-23 PROCEDURE — 85610 PROTHROMBIN TIME: CPT

## 2025-05-23 PROCEDURE — 36415 COLL VENOUS BLD VENIPUNCTURE: CPT

## 2025-05-23 NOTE — TELEPHONE ENCOUNTER
----- Message from Jimmy Mars MD sent at 5/23/2025  9:09 AM CDT -----  Please inform patient of results.     1. Continue current Coumadin dose.  Repeat INR in 1 month  ----- Message -----  From: Lab, Background User  Sent: 5/23/2025   8:32 AM CDT  To: Jimmy Mars MD

## 2025-06-16 ENCOUNTER — TELEPHONE (OUTPATIENT)
Dept: FAMILY MEDICINE | Facility: CLINIC | Age: 70
End: 2025-06-16
Payer: MEDICARE

## 2025-06-16 ENCOUNTER — HOSPITAL ENCOUNTER (EMERGENCY)
Facility: HOSPITAL | Age: 70
Discharge: HOME | End: 2025-06-16
Attending: FAMILY MEDICINE
Payer: MEDICARE

## 2025-06-16 ENCOUNTER — RESULTS FOLLOW-UP (OUTPATIENT)
Dept: FAMILY MEDICINE | Facility: CLINIC | Age: 70
End: 2025-06-16

## 2025-06-16 ENCOUNTER — LAB VISIT (OUTPATIENT)
Dept: LAB | Facility: HOSPITAL | Age: 70
End: 2025-06-16
Attending: INTERNAL MEDICINE
Payer: MEDICARE

## 2025-06-16 VITALS
WEIGHT: 185 LBS | SYSTOLIC BLOOD PRESSURE: 118 MMHG | HEIGHT: 69 IN | RESPIRATION RATE: 14 BRPM | DIASTOLIC BLOOD PRESSURE: 80 MMHG | BODY MASS INDEX: 27.4 KG/M2 | TEMPERATURE: 98 F | HEART RATE: 93 BPM | OXYGEN SATURATION: 95 %

## 2025-06-16 DIAGNOSIS — E78.5 HYPERLIPIDEMIA, UNSPECIFIED HYPERLIPIDEMIA TYPE: ICD-10-CM

## 2025-06-16 DIAGNOSIS — I10 HYPERTENSION, UNSPECIFIED TYPE: ICD-10-CM

## 2025-06-16 DIAGNOSIS — T45.511A COUMADIN TOXICITY: ICD-10-CM

## 2025-06-16 DIAGNOSIS — Z00.00 WELLNESS EXAMINATION: Primary | ICD-10-CM

## 2025-06-16 DIAGNOSIS — T45.511A POISONING BY WARFARIN SODIUM, ACCIDENTAL OR UNINTENTIONAL, INITIAL ENCOUNTER: Primary | ICD-10-CM

## 2025-06-16 DIAGNOSIS — Z79.01 ANTICOAGULATED ON COUMADIN: ICD-10-CM

## 2025-06-16 DIAGNOSIS — Z12.5 SCREENING FOR MALIGNANT NEOPLASM OF PROSTATE: ICD-10-CM

## 2025-06-16 LAB
INR PPP: 5.2
PROTHROMBIN TIME: 48.6 SECONDS (ref 9.1–10.9)

## 2025-06-16 PROCEDURE — 99283 EMERGENCY DEPT VISIT LOW MDM: CPT | Mod: 25

## 2025-06-16 PROCEDURE — 36415 COLL VENOUS BLD VENIPUNCTURE: CPT

## 2025-06-16 PROCEDURE — 85610 PROTHROMBIN TIME: CPT

## 2025-06-16 PROCEDURE — 93005 ELECTROCARDIOGRAM TRACING: CPT

## 2025-06-16 NOTE — ED PROVIDER NOTES
Encounter Date: 6/16/2025       History     Chief Complaint   Patient presents with    abnormal labs     Sent by PCP (Jerad) for elevated INR of 5.2.  Patient denies any bleeding, pain, or SOB.     This patient is a 69-year-old male with a history of atrial fibrillation, AAA, and stent who was sent by his PCP's office because in today's blood work his INR was 5.2.  This is the highest his INR has ever been.  Patient is supposed to have a cardioversion done 2 days from now.  We will be consulting Cardiology determine whether or not we will give vitamin K    The history is provided by the patient.     Review of patient's allergies indicates:  No Known Allergies  Past Medical History:   Diagnosis Date    AAA (abdominal aortic aneurysm)     Hypertension     Paroxysmal atrial fibrillation     Personal history of colonic polyps 09/30/2021     Past Surgical History:   Procedure Laterality Date    ABDOMINAL AORTIC ANEURYSM REPAIR      COLONOSCOPY  09/30/2021    Dr. Tony    CORONARY ANGIOPLASTY WITH STENT PLACEMENT      INSERTION OF BIVENTRICULAR IMPLANTABLE CARDIOVERTER-DEFIBRILLATOR (ICD)       Family History   Problem Relation Name Age of Onset    Kidney disease Mother Tasia     Heart disease Mother Tasia     Heart disease Father Padilla      Social History[1]  Review of Systems   Constitutional: Negative.    HENT: Negative.     Respiratory: Negative.     Cardiovascular: Negative.    Gastrointestinal: Negative.    Endocrine: Negative.    Musculoskeletal: Negative.    Skin: Negative.    Neurological: Negative.    Psychiatric/Behavioral: Negative.     All other systems reviewed and are negative.      Physical Exam     Initial Vitals [06/16/25 1713]   BP Pulse Resp Temp SpO2   125/84 87 20 97.3 °F (36.3 °C) 97 %      MAP       --         Physical Exam    Nursing note and vitals reviewed.  Constitutional: He appears well-developed and well-nourished.   HENT:   Head: Normocephalic.   Eyes: Pupils are equal, round, and  reactive to light.   Neck:   Normal range of motion.  Cardiovascular:  Normal rate and regular rhythm.           Pulmonary/Chest: Breath sounds normal.   Abdominal: Abdomen is soft. Bowel sounds are normal.   Musculoskeletal:         General: Normal range of motion.      Cervical back: Normal range of motion.     Neurological: He is alert and oriented to person, place, and time. He has normal reflexes. GCS score is 15. GCS eye subscore is 4. GCS verbal subscore is 5. GCS motor subscore is 6.   Skin: Skin is warm and dry. Capillary refill takes more than 3 seconds.   Psychiatric: He has a normal mood and affect.         ED Course   Procedures  Labs Reviewed - No data to display  EKG Readings: (Independently Interpreted)   Rhythm: Atrial Fibrillation. Heart Rate: 87. Ectopy: PVCs. ST Segments: Non-Specific ST Segment Depression. Clinical Impression: Atrial Fibrillation       Imaging Results    None          Medications - No data to display  Medical Decision Making  This patient is a a male with no complaints at this time.  Patient went to his PCP's office to get blood work prior to having a cardioversion done in 2 days.  Patient is on Coumadin and his INR was a highest it has ever been today in his at 5.2.  PCP's sent him over with consideration for vitamin K.  We will consult cardio  Differential diagnosis:  Coumadin toxicity    Amount and/or Complexity of Data Reviewed  Discussion of management or test interpretation with external provider(s): Discussed this case with BALDOMERO Rashid who states that she does not recommend vitamin K at this time.  She states that contraindication to him being cardioverted would be if he was subtherapeutic in his Coumadin.  She states to hold his Coumadin at this time and have his INR repeated tomorrow to determine if he will need a dose of Coumadin tomorrow night.  We will get in touch with his PCP Dr. Mars to contact the patient in the morning and get this done.                                       Clinical Impression:  Final diagnoses:  [T45.511A] Poisoning by warfarin sodium, accidental or unintentional, initial encounter (Primary)  [T45.511A] Coumadin toxicity          ED Disposition Condition    Discharge Stable          ED Prescriptions    None       Follow-up Information       Follow up With Specialties Details Why Contact Info    Jimmy Mars MD Family Medicine Schedule an appointment as soon as possible for a visit in 1 day  [address]  [facility] [address]  [phone]                     [1]   Social History  Tobacco Use    Smoking status: Every Day     Current packs/day: 0.50     Types: Cigarettes    Smokeless tobacco: Never   Substance Use Topics    Alcohol use: Not Currently    Drug use: Not Currently        Dari Alfredo MD  06/16/25 1092

## 2025-06-16 NOTE — CONSULTS
[facility] - Emergency Dept  Cardiology  Consult Note    Patient Name: Carl Mccollum Sr  MRN: [id number]  Admission Date: 6/16/2025  Hospital Length of Stay: 0 days  Code Status: No Order   Attending Provider: Dr. Dari Alfredo  Consulting Provider: Cathi Rashid NP  Primary Care Physician: Jimmy Mars MD  Principal Problem:<principal problem not specified>    Patient information was obtained from patient, past medical records, and ER records.     Inpatient consult to Telemedicine-Card  Consult performed by: Cathi Rashid NP  Consult ordered by: Dari Alfredo MD        Subjective:     Chief Complaint:  no complaints; sent by Dr. Mars for elevated INR     HPI: 68 yo male who is known to [facility], sees Dr. Alberto with history of CAD/PCI, HTN, HLD, PAF, AICD per Dr. German, on long term anticoagulation, AAA who was sent to ER by Dr. Mars secondary to PT/INR 48.6/5.2. INR is managed by Dr. Mars, goal INR is either 2-3 or 2.5-3.5. He has no symptoms. Denies CP, SOB, N/V, palpitations, presyncope, TIA type symptoms, bleeding, dark tarry stools or excessive bruising. He is scheduled for DCCV with Dr. German on Wednesday, 6/18/25. [facility] was consulted by Dr. Troy for coumadin toxicity.    PMH:  CAD/CATHLEEN 1/2020 5/2024 PET normal  PAF hx DCCV Dr. German  AICKATE   HTN  HLD  AAA 3.5 cm 3/2025  smoker    Past Surgical History:   Procedure Laterality Date    ABDOMINAL AORTIC ANEURYSM REPAIR      COLONOSCOPY  09/30/2021    Dr. Tony    CORONARY ANGIOPLASTY WITH STENT PLACEMENT      INSERTION OF BIVENTRICULAR IMPLANTABLE CARDIOVERTER-DEFIBRILLATOR (ICD)         Review of patient's allergies indicates:  No Known Allergies    No current facility-administered medications on file prior to encounter.     Current Outpatient Medications on File Prior to Encounter   Medication Sig    allopurinoL (ZYLOPRIM) 100 MG tablet TAKE TWO TABLETS BY MOUTH DAILY    amLODIPine (NORVASC) 5 MG tablet Take  5 mg by mouth once daily.    co-enzyme Q-10 50 mg capsule Take 50 mg by mouth once daily.    colchicine (COLCRYS) 0.6 mg tablet Take one tablet by mouth, may repeat in one hour. May not repeat again for another 24 hours    hydroCHLOROthiazide (HYDRODIURIL) 25 MG tablet Take 25 mg by mouth once daily.    nitroGLYCERIN (NITROSTAT) 0.4 MG SL tablet Place 0.4 mg under the tongue every 5 (five) minutes as needed for Chest pain.    olmesartan (BENICAR) 40 MG tablet Take 40 mg by mouth once daily.    pantoprazole (PROTONIX) 40 MG tablet TAKE ONE TABLET BY MOUTH DAILY FOR STOMACH    rosuvastatin (CRESTOR) 40 MG Tab Take 40 mg by mouth once daily.    sotaloL (BETAPACE) 80 MG tablet Take 80 mg by mouth 2 (two) times daily.    warfarin (COUMADIN) 4 MG tablet TAKE ONE TABLET BY MOUTH DAILY     Family History       Problem Relation (Age of Onset)    Heart disease Mother, Father    Kidney disease Mother          Tobacco Use    Smoking status: Every Day     Current packs/day: 0.50     Types: Cigarettes    Smokeless tobacco: Never   Substance and Sexual Activity    Alcohol use: Not Currently    Drug use: Not Currently    Sexual activity: Not Currently     Review of Systems   All other systems reviewed and are negative.    Objective:     Vital Signs (Most Recent):  Temp: 97.3 °F (36.3 °C) (06/16/25 1713)  Pulse: 94 (06/16/25 1802)  Resp: (!) 24 (06/16/25 1802)  BP: 114/86 (06/16/25 1802)  SpO2: 96 % (06/16/25 1802) Vital Signs (24h Range):  Temp:  [97.3 °F (36.3 °C)] 97.3 °F (36.3 °C)  Pulse:  [83-94] 94  Resp:  [20-24] 24  SpO2:  [93 %-97 %] 96 %  BP: (105-125)/(80-86) 114/86     Weight: 83.9 kg (185 lb)  Body mass index is 27.32 kg/m².    SpO2: 96 %       No intake or output data in the 24 hours ending 06/16/25 1834    Lines/Drains/Airways       None                       Significant Labs:   Recent Lab Results         06/16/25  0725        INR 5.2       PT 48.6               Significant Imaging: EKG: afib/flutter    Physical  Exam  Vitals reviewed.   Constitutional:       Appearance: Normal appearance. He is normal weight.   HENT:      Nose: Nose normal.   Eyes:      Extraocular Movements: Extraocular movements intact.   Cardiovascular:      Rate and Rhythm: Normal rate. Rhythm irregular.      Heart sounds: Normal heart sounds.   Pulmonary:      Breath sounds: Normal breath sounds.   Neurological:      General: No focal deficit present.      Mental Status: He is alert and oriented to person, place, and time.           Current Inpatient Medications:  Current Medications[1]      VTE Risk Mitigation (From admission, onward)      None          Assessment :     Supratherapeutic INR  PAF  AICD  CAD/CATHLEEN  HTN  HLD  AAA        Plan:     Sent by Dr. Mars secondary to elevated INR of 5.2. No symptoms of bleeding, dark tarry stools are excessive bruising. His goal INR is either 2-3 or 2.5-3.5. I was not able to find documentation of goal. Recommend holding coumadin today and recheck INR tomorrow. Follow up with Dr. Mars for further dosing instructions. I advised him to use caution in avoiding bumping or cutting himself and to hold direct pressure if he should incur any cuts or scrapes. He voiced understanding. I do not recommend vitamin K at this time.  Follow up with Dr. German as planned.    Thank you for allowing me to participate in this patient's care. Call with questions.        Cathi Rashid NP  Cardiology  [facility] - Emergency Dept  06/16/2025 6:34 PM            [1] No current facility-administered medications for this encounter.    Current Outpatient Medications:     allopurinoL (ZYLOPRIM) 100 MG tablet, TAKE TWO TABLETS BY MOUTH DAILY, Disp: 60 tablet, Rfl: 3    amLODIPine (NORVASC) 5 MG tablet, Take 5 mg by mouth once daily., Disp: , Rfl:     co-enzyme Q-10 50 mg capsule, Take 50 mg by mouth once daily., Disp: , Rfl:     colchicine (COLCRYS) 0.6 mg tablet, Take one tablet by mouth, may repeat in one hour. May not  repeat again for another 24 hours, Disp: 10 tablet, Rfl: 0    hydroCHLOROthiazide (HYDRODIURIL) 25 MG tablet, Take 25 mg by mouth once daily., Disp: , Rfl:     nitroGLYCERIN (NITROSTAT) 0.4 MG SL tablet, Place 0.4 mg under the tongue every 5 (five) minutes as needed for Chest pain., Disp: , Rfl:     olmesartan (BENICAR) 40 MG tablet, Take 40 mg by mouth once daily., Disp: , Rfl:     pantoprazole (PROTONIX) 40 MG tablet, TAKE ONE TABLET BY MOUTH DAILY FOR STOMACH, Disp: 30 tablet, Rfl: 11    rosuvastatin (CRESTOR) 40 MG Tab, Take 40 mg by mouth once daily., Disp: , Rfl:     sotaloL (BETAPACE) 80 MG tablet, Take 80 mg by mouth 2 (two) times daily., Disp: , Rfl:     warfarin (COUMADIN) 4 MG tablet, TAKE ONE TABLET BY MOUTH DAILY, Disp: 30 tablet, Rfl: 1

## 2025-06-17 ENCOUNTER — TELEPHONE (OUTPATIENT)
Dept: FAMILY MEDICINE | Facility: CLINIC | Age: 70
End: 2025-06-17
Payer: MEDICARE

## 2025-06-17 ENCOUNTER — LAB VISIT (OUTPATIENT)
Dept: LAB | Facility: HOSPITAL | Age: 70
End: 2025-06-17
Attending: FAMILY MEDICINE
Payer: MEDICARE

## 2025-06-17 ENCOUNTER — RESULTS FOLLOW-UP (OUTPATIENT)
Dept: FAMILY MEDICINE | Facility: CLINIC | Age: 70
End: 2025-06-17

## 2025-06-17 DIAGNOSIS — R79.1 ABNORMAL COAGULATION PROFILE: Primary | ICD-10-CM

## 2025-06-17 DIAGNOSIS — I48.91 ATRIAL FIBRILLATION, UNSPECIFIED TYPE: ICD-10-CM

## 2025-06-17 DIAGNOSIS — R79.1 ABNORMAL COAGULATION PROFILE: ICD-10-CM

## 2025-06-17 LAB
INR PPP: 3.3
OHS QRS DURATION: 104 MS
OHS QTC CALCULATION: 452 MS
PROTHROMBIN TIME: 31.6 SECONDS (ref 9.1–10.9)

## 2025-06-17 PROCEDURE — 36415 COLL VENOUS BLD VENIPUNCTURE: CPT

## 2025-06-17 PROCEDURE — 85610 PROTHROMBIN TIME: CPT

## 2025-06-17 NOTE — TELEPHONE ENCOUNTER
----- Message from Jimmy Mars MD sent at 6/17/2025  9:29 AM CDT -----  Please inform patient of results.     1. INR reviewed.  Restart Coumadin.  Repeat INR in 1 week.  Discussed ER precautions with patient  ----- Message -----  From: Lab, Background User  Sent: 6/17/2025   8:47 AM CDT  To: Jimmy Mars MD

## 2025-06-24 ENCOUNTER — TELEPHONE (OUTPATIENT)
Dept: FAMILY MEDICINE | Facility: CLINIC | Age: 70
End: 2025-06-24
Payer: MEDICARE

## 2025-06-24 ENCOUNTER — RESULTS FOLLOW-UP (OUTPATIENT)
Dept: FAMILY MEDICINE | Facility: CLINIC | Age: 70
End: 2025-06-24

## 2025-06-24 ENCOUNTER — LAB VISIT (OUTPATIENT)
Dept: LAB | Facility: HOSPITAL | Age: 70
End: 2025-06-24
Attending: FAMILY MEDICINE
Payer: MEDICARE

## 2025-06-24 DIAGNOSIS — R79.1 ABNORMAL COAGULATION PROFILE: ICD-10-CM

## 2025-06-24 DIAGNOSIS — I48.91 ATRIAL FIBRILLATION, UNSPECIFIED TYPE: ICD-10-CM

## 2025-06-24 DIAGNOSIS — R79.1 ABNORMAL COAGULATION PROFILE: Primary | ICD-10-CM

## 2025-06-24 LAB
INR PPP: 3.1
PROTHROMBIN TIME: 29.9 SECONDS (ref 9.1–10.9)

## 2025-06-24 PROCEDURE — 36415 COLL VENOUS BLD VENIPUNCTURE: CPT

## 2025-06-24 PROCEDURE — 85610 PROTHROMBIN TIME: CPT

## 2025-06-24 NOTE — TELEPHONE ENCOUNTER
----- Message from Jimmy Mars MD sent at 6/24/2025  8:56 AM CDT -----  Please inform patient of results.     1. Continue current Coumadin dose.  Repeat INR in 1 week  ----- Message -----  From: Lab, Background User  Sent: 6/24/2025   8:45 AM CDT  To: Jimmy Mars MD

## 2025-07-01 ENCOUNTER — LAB VISIT (OUTPATIENT)
Dept: LAB | Facility: HOSPITAL | Age: 70
End: 2025-07-01
Attending: FAMILY MEDICINE
Payer: MEDICARE

## 2025-07-01 ENCOUNTER — RESULTS FOLLOW-UP (OUTPATIENT)
Dept: FAMILY MEDICINE | Facility: CLINIC | Age: 70
End: 2025-07-01

## 2025-07-01 DIAGNOSIS — R79.1 ABNORMAL COAGULATION PROFILE: ICD-10-CM

## 2025-07-01 LAB
INR PPP: 3.1
PROTHROMBIN TIME: 30.2 SECONDS (ref 9.1–10.9)

## 2025-07-01 PROCEDURE — 85610 PROTHROMBIN TIME: CPT

## 2025-07-01 PROCEDURE — 36415 COLL VENOUS BLD VENIPUNCTURE: CPT

## 2025-07-03 ENCOUNTER — TELEPHONE (OUTPATIENT)
Dept: FAMILY MEDICINE | Facility: CLINIC | Age: 70
End: 2025-07-03
Payer: MEDICARE

## 2025-07-03 DIAGNOSIS — R79.1 ABNORMAL COAGULATION PROFILE: Primary | ICD-10-CM

## 2025-07-03 NOTE — TELEPHONE ENCOUNTER
----- Message from Jimmy Mars MD sent at 7/1/2025  8:08 AM CDT -----  Please inform patient of results.     1. Coumadin.  Repeat INR in 1 week  ----- Message -----  From: Lab, Background User  Sent: 7/1/2025   8:00 AM CDT  To: Jimmy Mars MD

## 2025-07-08 ENCOUNTER — LAB VISIT (OUTPATIENT)
Dept: LAB | Facility: HOSPITAL | Age: 70
End: 2025-07-08
Attending: FAMILY MEDICINE
Payer: MEDICARE

## 2025-07-08 ENCOUNTER — TELEPHONE (OUTPATIENT)
Dept: FAMILY MEDICINE | Facility: CLINIC | Age: 70
End: 2025-07-08
Payer: MEDICARE

## 2025-07-08 DIAGNOSIS — R79.1 ABNORMAL COAGULATION PROFILE: ICD-10-CM

## 2025-07-08 LAB
INR PPP: 2.8
PROTHROMBIN TIME: 26.8 SECONDS (ref 9.1–10.9)

## 2025-07-08 PROCEDURE — 85610 PROTHROMBIN TIME: CPT

## 2025-07-08 PROCEDURE — 36415 COLL VENOUS BLD VENIPUNCTURE: CPT

## 2025-07-08 NOTE — TELEPHONE ENCOUNTER
----- Message from Nurse Brown sent at 7/8/2025  3:42 PM CDT -----    ----- Message -----  From: Jimmy Mars MD  Sent: 7/8/2025   8:43 AM CDT  To: Stephanie Borges LPN    Please inform patient of results.     1. INR therapeutic.  Continue current Coumadin dose.  Repeat INR in 2 weeks  ----- Message -----  From: Lab, Background User  Sent: 7/8/2025   8:07 AM CDT  To: Jimmy Mars MD

## 2025-07-10 DIAGNOSIS — I48.91 ATRIAL FIBRILLATION, UNSPECIFIED TYPE: ICD-10-CM

## 2025-07-10 RX ORDER — WARFARIN 4 MG/1
4 TABLET ORAL
Qty: 30 TABLET | Refills: 1 | Status: SHIPPED | OUTPATIENT
Start: 2025-07-10

## 2025-07-16 DIAGNOSIS — K21.9 GASTROESOPHAGEAL REFLUX DISEASE, UNSPECIFIED WHETHER ESOPHAGITIS PRESENT: ICD-10-CM

## 2025-07-16 RX ORDER — PANTOPRAZOLE SODIUM 40 MG/1
TABLET, DELAYED RELEASE ORAL
Qty: 30 TABLET | Refills: 3 | Status: SHIPPED | OUTPATIENT
Start: 2025-07-16

## 2025-07-22 ENCOUNTER — LAB VISIT (OUTPATIENT)
Dept: LAB | Facility: HOSPITAL | Age: 70
End: 2025-07-22
Attending: FAMILY MEDICINE
Payer: MEDICARE

## 2025-07-22 DIAGNOSIS — R79.1 ABNORMAL COAGULATION PROFILE: Primary | ICD-10-CM

## 2025-07-22 DIAGNOSIS — R79.1 ABNORMAL COAGULATION PROFILE: ICD-10-CM

## 2025-07-22 LAB
INR PPP: 3.4
PROTHROMBIN TIME: 33.1 SECONDS (ref 9.1–10.9)

## 2025-07-22 PROCEDURE — 36415 COLL VENOUS BLD VENIPUNCTURE: CPT

## 2025-07-22 PROCEDURE — 85610 PROTHROMBIN TIME: CPT

## 2025-07-23 ENCOUNTER — TELEPHONE (OUTPATIENT)
Dept: FAMILY MEDICINE | Facility: CLINIC | Age: 70
End: 2025-07-23
Payer: MEDICARE

## 2025-07-23 DIAGNOSIS — Z79.01 ANTICOAGULATED ON COUMADIN: Primary | ICD-10-CM

## 2025-07-23 NOTE — TELEPHONE ENCOUNTER
No answer, no voicemail. Pt to hold coumadin 2 days and restart. INR ordered for pt to repeat 8/4/25 (1 wk after restarting coumadin).

## 2025-07-23 NOTE — TELEPHONE ENCOUNTER
----- Message from Jimmy Mars MD sent at 7/22/2025 11:55 AM CDT -----  Please inform patient of results.     1. Hold Coumadin x2 days then restart current dose.  Repeat INR in 1 week past restart  ----- Message -----  From: Lab, Background User  Sent: 7/22/2025   8:29 AM CDT  To: Jimmy Mars MD

## 2025-07-25 ENCOUNTER — TELEPHONE (OUTPATIENT)
Dept: FAMILY MEDICINE | Facility: CLINIC | Age: 70
End: 2025-07-25
Payer: MEDICARE

## 2025-07-25 NOTE — TELEPHONE ENCOUNTER
----- Message from Nurse Stephanie sent at 7/25/2025  8:02 AM CDT -----  He also never called me back and I tried again yesterday    ----- Message -----  From: Jimmy Mars MD  Sent: 7/22/2025  11:55 AM CDT  To: Stephanie Borges LPN    Please inform patient of results.     1. Hold Coumadin x2 days then restart current dose.  Repeat INR in 1 week past restart  ----- Message -----  From: Lab, Background User  Sent: 7/22/2025   8:29 AM CDT  To: Jimmy Mars MD

## 2025-07-29 ENCOUNTER — LAB VISIT (OUTPATIENT)
Dept: LAB | Facility: HOSPITAL | Age: 70
End: 2025-07-29
Attending: FAMILY MEDICINE
Payer: MEDICARE

## 2025-07-29 ENCOUNTER — RESULTS FOLLOW-UP (OUTPATIENT)
Dept: FAMILY MEDICINE | Facility: CLINIC | Age: 70
End: 2025-07-29
Payer: MEDICARE

## 2025-07-29 DIAGNOSIS — E78.5 HYPERLIPIDEMIA, UNSPECIFIED HYPERLIPIDEMIA TYPE: ICD-10-CM

## 2025-07-29 DIAGNOSIS — I10 HYPERTENSION, UNSPECIFIED TYPE: ICD-10-CM

## 2025-07-29 DIAGNOSIS — Z79.01 ANTICOAGULATED ON COUMADIN: Primary | ICD-10-CM

## 2025-07-29 DIAGNOSIS — Z00.00 WELLNESS EXAMINATION: ICD-10-CM

## 2025-07-29 DIAGNOSIS — Z79.01 ANTICOAGULATED ON COUMADIN: ICD-10-CM

## 2025-07-29 DIAGNOSIS — Z12.5 SCREENING FOR MALIGNANT NEOPLASM OF PROSTATE: ICD-10-CM

## 2025-07-29 LAB
ALBUMIN SERPL-MCNC: 3.5 G/DL (ref 3.4–4.8)
ALBUMIN/GLOB SERPL: 0.9 RATIO (ref 1.1–2)
ALP SERPL-CCNC: 114 UNIT/L (ref 40–150)
ALT SERPL-CCNC: 8 UNIT/L (ref 0–55)
ANION GAP SERPL CALC-SCNC: 10 MEQ/L
AST SERPL-CCNC: 14 UNIT/L (ref 11–45)
BASOPHILS # BLD AUTO: 0.1 X10(3)/MCL
BASOPHILS NFR BLD AUTO: 0.9 %
BILIRUB SERPL-MCNC: 0.6 MG/DL
BUN SERPL-MCNC: 27 MG/DL (ref 8.4–25.7)
CALCIUM SERPL-MCNC: 9.5 MG/DL (ref 8.8–10)
CHLORIDE SERPL-SCNC: 101 MMOL/L (ref 98–107)
CHOLEST SERPL-MCNC: 111 MG/DL
CHOLEST/HDLC SERPL: 3 {RATIO} (ref 0–5)
CO2 SERPL-SCNC: 29 MMOL/L (ref 23–31)
CREAT SERPL-MCNC: 1.18 MG/DL (ref 0.72–1.25)
CREAT/UREA NIT SERPL: 23
EOSINOPHIL # BLD AUTO: 0.32 X10(3)/MCL (ref 0–0.9)
EOSINOPHIL NFR BLD AUTO: 2.7 %
ERYTHROCYTE [DISTWIDTH] IN BLOOD BY AUTOMATED COUNT: 15.6 % (ref 11.5–17)
GFR SERPLBLD CREATININE-BSD FMLA CKD-EPI: >60 ML/MIN/1.73/M2
GLOBULIN SER-MCNC: 3.9 GM/DL (ref 2.4–3.5)
GLUCOSE SERPL-MCNC: 95 MG/DL (ref 82–115)
HCT VFR BLD AUTO: 49.1 % (ref 42–52)
HDLC SERPL-MCNC: 37 MG/DL (ref 35–60)
HGB BLD-MCNC: 17 G/DL (ref 14–18)
IMM GRANULOCYTES # BLD AUTO: 0.06 X10(3)/MCL (ref 0–0.04)
IMM GRANULOCYTES NFR BLD AUTO: 0.5 %
INR PPP: 2.6
LDLC SERPL CALC-MCNC: 53 MG/DL (ref 50–140)
LYMPHOCYTES # BLD AUTO: 3.09 X10(3)/MCL (ref 0.6–4.6)
LYMPHOCYTES NFR BLD AUTO: 26.5 %
MCH RBC QN AUTO: 31.1 PG (ref 27–31)
MCHC RBC AUTO-ENTMCNC: 34.6 G/DL (ref 33–36)
MCV RBC AUTO: 89.9 FL (ref 80–94)
MONOCYTES # BLD AUTO: 0.75 X10(3)/MCL (ref 0.1–1.3)
MONOCYTES NFR BLD AUTO: 6.4 %
NEUTROPHILS # BLD AUTO: 7.32 X10(3)/MCL (ref 2.1–9.2)
NEUTROPHILS NFR BLD AUTO: 63 %
NRBC BLD AUTO-RTO: 0 %
PLATELET # BLD AUTO: 196 X10(3)/MCL (ref 130–400)
PMV BLD AUTO: 9.9 FL (ref 7.4–10.4)
POTASSIUM SERPL-SCNC: 4.7 MMOL/L (ref 3.5–5.1)
PROT SERPL-MCNC: 7.4 GM/DL (ref 5.8–7.6)
PROTHROMBIN TIME: 25.8 SECONDS (ref 9.1–10.9)
PSA SERPL-MCNC: 1.14 NG/ML
RBC # BLD AUTO: 5.46 X10(6)/MCL (ref 4.7–6.1)
SODIUM SERPL-SCNC: 140 MMOL/L (ref 136–145)
TRIGL SERPL-MCNC: 104 MG/DL (ref 34–140)
VLDLC SERPL CALC-MCNC: 21 MG/DL
WBC # BLD AUTO: 11.64 X10(3)/MCL (ref 4.5–11.5)

## 2025-07-29 PROCEDURE — 36415 COLL VENOUS BLD VENIPUNCTURE: CPT

## 2025-07-29 PROCEDURE — 80053 COMPREHEN METABOLIC PANEL: CPT

## 2025-07-29 PROCEDURE — 85610 PROTHROMBIN TIME: CPT

## 2025-07-29 PROCEDURE — 84153 ASSAY OF PSA TOTAL: CPT

## 2025-07-29 PROCEDURE — 80061 LIPID PANEL: CPT

## 2025-07-29 PROCEDURE — 85025 COMPLETE CBC W/AUTO DIFF WBC: CPT

## 2025-07-29 NOTE — PROGRESS NOTES
Please inform patient of results.    1. INR therapeutic.  Continue current dose of Coumadin; should be 4 mg by mouth daily.  Repeat INR in 2 weeks just prior to wellness exam; order placed.  Remaining labs will be discussed during wellness exam.  Keep scheduled upcoming appointment.  Call with any concerns.

## 2025-08-12 ENCOUNTER — LAB VISIT (OUTPATIENT)
Dept: LAB | Facility: HOSPITAL | Age: 70
End: 2025-08-12
Attending: FAMILY MEDICINE
Payer: MEDICARE

## 2025-08-12 ENCOUNTER — TELEPHONE (OUTPATIENT)
Dept: FAMILY MEDICINE | Facility: CLINIC | Age: 70
End: 2025-08-12
Payer: MEDICARE

## 2025-08-12 DIAGNOSIS — Z79.01 ANTICOAGULATED ON COUMADIN: ICD-10-CM

## 2025-08-12 DIAGNOSIS — Z79.01 ANTICOAGULATED ON COUMADIN: Primary | ICD-10-CM

## 2025-08-12 LAB
INR PPP: 3.2
PROTHROMBIN TIME: 31.1 SECONDS (ref 9.1–10.9)

## 2025-08-12 PROCEDURE — 85610 PROTHROMBIN TIME: CPT

## 2025-08-12 PROCEDURE — 36415 COLL VENOUS BLD VENIPUNCTURE: CPT

## 2025-08-14 ENCOUNTER — OFFICE VISIT (OUTPATIENT)
Dept: FAMILY MEDICINE | Facility: CLINIC | Age: 70
End: 2025-08-14
Payer: MEDICARE

## 2025-08-14 VITALS
RESPIRATION RATE: 18 BRPM | BODY MASS INDEX: 26.51 KG/M2 | HEIGHT: 69 IN | TEMPERATURE: 97 F | SYSTOLIC BLOOD PRESSURE: 102 MMHG | HEART RATE: 93 BPM | OXYGEN SATURATION: 96 % | WEIGHT: 179 LBS | DIASTOLIC BLOOD PRESSURE: 68 MMHG

## 2025-08-14 DIAGNOSIS — M10.9 GOUT, UNSPECIFIED CAUSE, UNSPECIFIED CHRONICITY, UNSPECIFIED SITE: ICD-10-CM

## 2025-08-14 DIAGNOSIS — Z23 NEED FOR VACCINATION: ICD-10-CM

## 2025-08-14 DIAGNOSIS — Z12.11 COLON CANCER SCREENING: ICD-10-CM

## 2025-08-14 DIAGNOSIS — Z00.00 WELLNESS EXAMINATION: Primary | ICD-10-CM

## 2025-08-14 PROBLEM — M25.50 ARTHRALGIA: Status: RESOLVED | Noted: 2025-08-14 | Resolved: 2025-08-14

## 2025-08-14 PROBLEM — M25.50 ARTHRALGIA: Status: ACTIVE | Noted: 2025-08-14

## 2025-08-14 RX ORDER — METOPROLOL SUCCINATE 100 MG/1
100 TABLET, EXTENDED RELEASE ORAL DAILY
COMMUNITY
Start: 2025-07-22

## 2025-08-19 ENCOUNTER — LAB VISIT (OUTPATIENT)
Dept: LAB | Facility: HOSPITAL | Age: 70
End: 2025-08-19
Attending: FAMILY MEDICINE
Payer: MEDICARE

## 2025-08-19 ENCOUNTER — TELEPHONE (OUTPATIENT)
Dept: FAMILY MEDICINE | Facility: CLINIC | Age: 70
End: 2025-08-19
Payer: MEDICARE

## 2025-08-19 DIAGNOSIS — M10.9 GOUT, UNSPECIFIED CAUSE, UNSPECIFIED CHRONICITY, UNSPECIFIED SITE: ICD-10-CM

## 2025-08-19 DIAGNOSIS — Z79.01 ANTICOAGULATED ON COUMADIN: Primary | ICD-10-CM

## 2025-08-19 DIAGNOSIS — Z79.01 ANTICOAGULATED ON COUMADIN: ICD-10-CM

## 2025-08-19 LAB
INR PPP: 2.2
PROTHROMBIN TIME: 21.2 SECONDS (ref 9.1–10.9)
URATE SERPL-MCNC: 6.1 MG/DL (ref 3.5–7.2)

## 2025-08-19 PROCEDURE — 85610 PROTHROMBIN TIME: CPT

## 2025-08-19 PROCEDURE — 84550 ASSAY OF BLOOD/URIC ACID: CPT

## 2025-08-19 PROCEDURE — 36415 COLL VENOUS BLD VENIPUNCTURE: CPT

## 2025-09-03 ENCOUNTER — TELEPHONE (OUTPATIENT)
Dept: FAMILY MEDICINE | Facility: CLINIC | Age: 70
End: 2025-09-03
Payer: MEDICARE

## 2025-09-03 DIAGNOSIS — Z79.01 ANTICOAGULATED ON COUMADIN: Primary | ICD-10-CM
